# Patient Record
Sex: FEMALE | Race: BLACK OR AFRICAN AMERICAN | Employment: UNEMPLOYED | URBAN - METROPOLITAN AREA
[De-identification: names, ages, dates, MRNs, and addresses within clinical notes are randomized per-mention and may not be internally consistent; named-entity substitution may affect disease eponyms.]

---

## 2017-05-17 ENCOUNTER — OFFICE VISIT (OUTPATIENT)
Dept: INTERNAL MEDICINE CLINIC | Age: 32
End: 2017-05-17

## 2017-05-17 VITALS
TEMPERATURE: 98 F | BODY MASS INDEX: 34.05 KG/M2 | HEART RATE: 63 BPM | OXYGEN SATURATION: 98 % | HEIGHT: 65 IN | SYSTOLIC BLOOD PRESSURE: 111 MMHG | WEIGHT: 204.4 LBS | RESPIRATION RATE: 16 BRPM | DIASTOLIC BLOOD PRESSURE: 63 MMHG

## 2017-05-17 DIAGNOSIS — R51.9 ACUTE INTRACTABLE HEADACHE, UNSPECIFIED HEADACHE TYPE: Primary | ICD-10-CM

## 2017-05-17 DIAGNOSIS — Z12.4 CERVICAL CANCER SCREENING: ICD-10-CM

## 2017-05-17 DIAGNOSIS — N89.8 VAGINAL ITCHING: ICD-10-CM

## 2017-05-17 DIAGNOSIS — N89.8 VAGINAL DISCHARGE: ICD-10-CM

## 2017-05-17 DIAGNOSIS — J30.9 ALLERGIC RHINITIS, UNSPECIFIED ALLERGIC RHINITIS TRIGGER, UNSPECIFIED RHINITIS SEASONALITY: ICD-10-CM

## 2017-05-17 RX ORDER — IBUPROFEN 200 MG
1 TABLET ORAL EVERY 24 HOURS
COMMUNITY
End: 2018-01-30

## 2017-05-17 RX ORDER — FLUCONAZOLE 150 MG/1
150 TABLET ORAL DAILY
Qty: 1 TAB | Refills: 0 | Status: SHIPPED | OUTPATIENT
Start: 2017-05-17 | End: 2017-05-18

## 2017-05-17 RX ORDER — RISPERIDONE 0.25 MG/1
0.25 TABLET, FILM COATED ORAL
Refills: 0 | COMMUNITY
Start: 2017-05-05 | End: 2020-05-16

## 2017-05-17 RX ORDER — FLUTICASONE PROPIONATE 50 MCG
SPRAY, SUSPENSION (ML) NASAL
Qty: 1 BOTTLE | Refills: 2 | Status: SHIPPED | OUTPATIENT
Start: 2017-05-17 | End: 2017-12-29

## 2017-05-17 RX ORDER — LEVOCETIRIZINE DIHYDROCHLORIDE 5 MG/1
5 TABLET, FILM COATED ORAL DAILY
Qty: 30 TAB | Refills: 3 | Status: SHIPPED | OUTPATIENT
Start: 2017-05-17 | End: 2017-12-29

## 2017-05-17 NOTE — PATIENT INSTRUCTIONS
1) follow-up as needed or sooner if worsening symptoms. Allergies: Care Instructions  Your Care Instructions  Allergies occur when your body's defense system (immune system) overreacts to certain substances. The immune system treats a harmless substance as if it were a harmful germ or virus. Many things can cause this overreaction, including pollens, medicine, food, dust, animal dander, and mold. Allergies can be mild or severe. Mild allergies can be managed with home treatment. But medicine may be needed to prevent problems. Managing your allergies is an important part of staying healthy. Your doctor may suggest that you have allergy testing to help find out what is causing your allergies. When you know what things trigger your symptoms, you can avoid them. This can prevent allergy symptoms and other health problems. For severe allergies that cause reactions that affect your whole body (anaphylactic reactions), your doctor may prescribe a shot of epinephrine to carry with you in case you have a severe reaction. Learn how to give yourself the shot and keep it with you at all times. Make sure it is not . Follow-up care is a key part of your treatment and safety. Be sure to make and go to all appointments, and call your doctor if you are having problems. It's also a good idea to know your test results and keep a list of the medicines you take. How can you care for yourself at home? · If you have been told by your doctor that dust or dust mites are causing your allergy, decrease the dust around your bed:  [facility] sheets, pillowcases, and other bedding in hot water every week. ¨ Use dust-proof covers for pillows, duvets, and mattresses. Avoid plastic covers because they tear easily and do not \"breathe. \" Wash as instructed on the label. ¨ Do not use any blankets and pillows that you do not need. ¨ Use blankets that you can wash in your washing machine.   ¨ Consider removing drapes and carpets, which attract and hold dust, from your bedroom. · If you are allergic to house dust and mites, do not use home humidifiers. Your doctor can suggest ways you can control dust and mites. · Look for signs of cockroaches. Cockroaches cause allergic reactions. Use cockroach baits to get rid of them. Then, clean your home well. Cockroaches like areas where grocery bags, newspapers, empty bottles, or cardboard boxes are stored. Do not keep these inside your home, and keep trash and food containers sealed. Seal off any spots where cockroaches might enter your home. · If you are allergic to mold, get rid of furniture, rugs, and drapes that smell musty. Check for mold in the bathroom. · If you are allergic to outdoor pollen or mold spores, use air-conditioning. Change or clean all filters every month. Keep windows closed. · If you are allergic to pollen, stay inside when pollen counts are high. Use a vacuum  with a HEPA filter or a double-thickness filter at least two times each week. · Stay inside when air pollution is bad. Avoid paint fumes, perfumes, and other strong odors. · Avoid conditions that make your allergies worse. Stay away from smoke. Do not smoke or let anyone else smoke in your house. Do not use fireplaces or wood-burning stoves. · If you are allergic to your pets, change the air filter in your furnace every month. Use high-efficiency filters. · If you are allergic to pet dander, keep pets outside or out of your bedroom. Old carpet and cloth furniture can hold a lot of animal dander. You may need to replace them. When should you call for help? Give an epinephrine shot if:  · You think you are having a severe allergic reaction. · You have symptoms in more than one body area, such as mild nausea and an itchy mouth. After giving an epinephrine shot call 911, even if you feel better. Call 911 if:  · You have symptoms of a severe allergic reaction.  These may include:  ¨ Sudden raised, red areas (hives) all over your body. ¨ Swelling of the throat, mouth, lips, or tongue. ¨ Trouble breathing. ¨ Passing out (losing consciousness). Or you may feel very lightheaded or suddenly feel weak, confused, or restless. · You have been given an epinephrine shot, even if you feel better. Call your doctor now or seek immediate medical care if:  · You have symptoms of an allergic reaction, such as:  ¨ A rash or hives (raised, red areas on the skin). ¨ Itching. ¨ Swelling. ¨ Belly pain, nausea, or vomiting. Watch closely for changes in your health, and be sure to contact your doctor if:  · You do not get better as expected. Where can you learn more? Go to http://kaya-bernard.info/. Enter F699 in the search box to learn more about \"Allergies: Care Instructions. \"  Current as of: February 12, 2016  Content Version: 11.2  © 8406-5477 [employer]. Care instructions adapted under license by [email or web address] (which disclaims liability or warranty for this information). If you have questions about a medical condition or this instruction, always ask your healthcare professional. [place] [address] any warranty or liability for your use of this information. Vaginal Yeast Infection: Care Instructions  Your Care Instructions  A vaginal yeast infection is caused by too many yeast cells in the vagina. This is common in women of all ages. Itching, vaginal discharge and irritation, and other symptoms can bother you. But yeast infections don't often cause other health problems. Some medicines can increase your risk of getting a yeast infection. These include antibiotics, birth control pills, hormones, and steroids. You may also be more likely to get a yeast infection if you are pregnant, have diabetes, douche, or wear tight clothes. With treatment, most yeast infections get better in 2 to 3 days. Follow-up care is a key part of your treatment and safety.  Be sure to make and go to all appointments, and call your doctor if you are having problems. It's also a good idea to know your test results and keep a list of the medicines you take. How can you care for yourself at home? · Take your medicines exactly as prescribed. Call your doctor if you think you are having a problem with your medicine. · Ask your doctor about over-the-counter (OTC) medicines for yeast infections. They may cost less than prescription medicines. If you use an OTC treatment, read and follow all instructions on the label. · Do not use tampons while using a vaginal cream or suppository. The tampons can absorb the medicine. Use pads instead. · Wear loose cotton clothing. Do not wear nylon or other fabric that holds body heat and moisture close to the skin. · Try sleeping without underwear. · Do not scratch. Relieve itching with a cold pack or a cool bath. · Do not wash your vaginal area more than once a day. Use plain water or a mild, unscented soap. Air-dry the vaginal area. · Change out of wet swimsuits after swimming. · Do not have sex until you have finished your treatment. · Do not douche. When should you call for help? Call your doctor now or seek immediate medical care if:  · You have unexpected vaginal bleeding. · You have new or increased pain in your vagina or pelvis. Watch closely for changes in your health, and be sure to contact your doctor if:  · You have a fever. · You are not getting better after 2 days. · Your symptoms come back after you finish your medicines. Where can you learn more? Go to http://kaya-bernard.info/. Enter O179 in the search box to learn more about \"Vaginal Yeast Infection: Care Instructions. \"  Current as of: October 13, 2016  Content Version: 11.2  © 9617-7854 [employer]. Care instructions adapted under license by [employer] (which disclaims liability or warranty for this information).  If you have questions about a medical condition or this instruction, always ask your healthcare professional. [place] [address] any warranty or liability for your use of this information.

## 2017-05-17 NOTE — PROGRESS NOTES
ROOM # 2    Marcial Nuñez presents today for   Chief Complaint   Patient presents with    Headache     frequent severe HA's    Vaginal Itching     requesting pap/vag exam. clear discharge       Edra Tyler Schwartz preferred language for health care discussion is english/other. Is someone accompanying this pt? no    Is the patient using any DME equipment during OV? no    Depression Screening:  PHQ over the last two weeks 5/17/2017   Little interest or pleasure in doing things More than half the days   Feeling down, depressed or hopeless More than half the days   Total Score PHQ 2 4       Learning Assessment:  Learning Assessment 5/17/2017   PRIMARY LEARNER Patient   HIGHEST LEVEL OF EDUCATION - PRIMARY LEARNER  GRADUATED HIGH SCHOOL OR GED   BARRIERS PRIMARY LEARNER NONE   CO-LEARNER CAREGIVER No   PRIMARY LANGUAGE ENGLISH   LEARNER PREFERENCE PRIMARY LISTENING   ANSWERED BY patient   RELATIONSHIP SELF       Abuse Screening:  No flowsheet data found. Fall Risk  No flowsheet data found. Health Maintenance reviewed and discussed per provider. Yes    Marcial Nuñez is due for pap smear. Please order/place referral if appropriate. Advance Directive:  1. Do you have an advance directive in place? Patient Reply: no    2. If not, would you like material regarding how to put one in place? Patient Reply: no    Coordination of Care:  1. Have you been to the ER, urgent care clinic since your last visit? Hospitalized since your last visit? no    2. Have you seen or consulted any other health care providers outside of the [address] [place] [address] since your last visit? Include any pap smears or colon screening.  no

## 2017-05-17 NOTE — PROGRESS NOTES
Chief Complaint   Patient presents with    Headache     frequent severe HA's    Vaginal Itching     requesting pap/vag exam. clear discharge       HPI:     Natalia Vargas is a 33 y.o.  female with history of  ADHD and bipolar depression here for the above complaint. She is having vaginal itching for 1 week. She has vaginal discharge that is clear, but no fish odor. She has some lower abdominal cramping, but she is suppose to have her period next week. She was last sexually active 3 weeks ago. She has some tightness in her chest and some shortness of breath. No radiation down the arm. She was taking 200mg of ibuprofen for her headaches, but not helping now. Headaches: 1 week. Constant headaches. Located on both temples. She said the pain is a throbbing pain. Sound affects it, but not light. She denies any blurred vision or dizziness or diplopia. Past Medical History:   Diagnosis Date    ADHD (attention deficit hyperactivity disorder)     Anemia     Bipolar depression ([address]     Depression     Miscarriage 09/2016     Past Surgical History:   Procedure Laterality Date    ABDOMEN SURGERY PROC UNLISTED      HX GI      HX OTHER SURGICAL      rectal surgery for abscess    HX OTHER SURGICAL      diverticulum near ovary removed, transvaginal     Current Outpatient Prescriptions   Medication Sig    risperiDONE (RISPERDAL) 0.25 mg tablet Take 0.25 mg by mouth nightly as needed.  NAPROXEN (NAPROSYN PO) Take  by mouth daily.  nicotine (NICODERM CQ) 14 mg/24 hr patch 1 Patch by TransDERmal route every twenty-four (24) hours.  fluconazole (DIFLUCAN) 150 mg tablet Take 1 Tab by mouth daily for 1 day. FDA advises cautious prescribing of oral fluconazole in pregnancy.  levocetirizine (XYZAL) 5 mg tablet Take 1 Tab by mouth daily.     fluticasone (FLONASE) 50 mcg/actuation nasal spray Use 2 sprays in each nostril daily     No current facility-administered medications for this visit. Health Maintenance   Topic Date Due    INFLUENZA AGE 9 TO ADULT  08/01/2017    PAP AKA CERVICAL CYTOLOGY  05/17/2020    DTaP/Tdap/Td series (2 - Td) 12/08/2026    Pneumococcal 19-64 Medium Risk  Addressed       There is no immunization history on file for this patient. Patient's last menstrual period was 04/24/2017. Allergies and Intolerances: Allergies   Allergen Reactions    Peanut Hives       Family History:   Family History   Problem Relation Age of Onset    Hypertension Mother     Diabetes Mother     Diabetes Father     Hypertension Father     Stroke Maternal Grandmother        Social History:   She  reports that she quit smoking about 2 months ago. She has a 2.50 pack-year smoking history. She has never used smokeless tobacco.  She  reports that she drinks alcohol. OBJECTIVE:   Physical exam:   Visit Vitals    /63 (BP 1 Location: Left arm, BP Patient Position: Sitting)    Pulse 63    Temp 98 °F (36.7 °C) (Oral)    Resp 16    Ht 5' 5\" (1.651 m)    Wt 204 lb 6.4 oz (92.7 kg)    LMP 04/24/2017    SpO2 98%    BMI 34.01 kg/m2        Generally: Pleasant female in no acute distress  HEENT Exam: Head: atraumatic, acephalic                Eyes: PERRLA     Ears: bilaterally normal TM, no erythema or exudate, normal light reflex    Nares: mucosal membranes moist, no erythema    Mouth: Clear, no erythema or exudate    Neck: supple, no LAD    Cardiac Exam: regular, rate, and rhythm. Normal S1 and S2. No murmurs, gallops, or rubs  Pulmonary exam: Clear to auscultation bilaterally  Abdominal exam: Positive bowel sounds in all four quadrants, soft, nondistended, nontender  Extremities: 2+ dorsalis pedis pulses bilaterally. No pedal edema    bilaterally  Gyn exam: external genitalia was normal. Vaginal vault: copious white discharge with bubbles and possible fishy odor. Cervix normal with some bleeding as pt is going to get her period next week.  Otherwise normal vaginal mucosa. LABS/RADIOLOGICAL TESTS:  Lab Results   Component Value Date/Time    WBC 7.4 08/29/2016 09:20 AM    HGB 9.9 08/29/2016 09:20 AM    HCT 31.1 08/29/2016 09:20 AM    PLATELET 471 04/07/0457 09:20 AM     Lab Results   Component Value Date/Time    Sodium 138 07/21/2016 11:26 AM    Potassium 3.7 07/21/2016 11:26 AM    Chloride 105 07/21/2016 11:26 AM    CO2 23 07/21/2016 11:26 AM    Glucose 89 07/21/2016 11:26 AM    BUN 17 07/21/2016 11:26 AM    Creatinine 0.58 07/21/2016 11:26 AM     No results found for: CHOL, CHOLX, CHLST, CHOLV, HDL, LDL, DLDL, LDLC, DLDLP, TGL, TGLX, TRIGL, TRIGP  No results found for: GPT    Previous labs    ASSESSMENT/PLAN:    1. Acute intractable headache, unspecified headache type: think these headaches might be due to allergies. Sent electronically the xyzal and flonase. 2. Allergic rhinitis, unspecified allergic rhinitis trigger, unspecified rhinitis seasonality  -     levocetirizine (XYZAL) 5 mg tablet; Take 1 Tab by mouth daily. -     fluticasone (FLONASE) 50 mcg/actuation nasal spray; Use 2 sprays in each nostril daily    3. Vaginal itching  -     NUSWAB VAGINITIS PLUS; Future  -     fluconazole (DIFLUCAN) 150 mg tablet; Take 1 Tab by mouth daily for 1 day. FDA advises cautious prescribing of oral fluconazole in pregnancy. -     NUSWAB VAGINITIS PLUS    4. Vaginal discharge  -     NUSWAB VAGINITIS PLUS; Future  -     NUSWAB VAGINITIS PLUS    5. Cervical cancer screening  -     PAP, IG, RFX HPV ASCUS ([id number]); Future  -     PAP, IG, RFX HPV ASCUS ([id number])    6. Requested Prescriptions     Signed Prescriptions Disp Refills    fluconazole (DIFLUCAN) 150 mg tablet 1 Tab 0     Sig: Take 1 Tab by mouth daily for 1 day. FDA advises cautious prescribing of oral fluconazole in pregnancy.  levocetirizine (XYZAL) 5 mg tablet 30 Tab 3     Sig: Take 1 Tab by mouth daily.     fluticasone (FLONASE) 50 mcg/actuation nasal spray 1 Bottle 2     Sig: Use 2 sprays in each nostril daily     7. Patient verbalized understanding and agreement with the plan. 8. Patient was given an after-visit summary. 9.   Follow-up Disposition:  Return if symptoms worsen or fail to improve. or sooner if worsening symptoms.           Rosemary Quijano MD

## 2017-05-17 NOTE — MR AVS SNAPSHOT
Visit Information Date & Time Provider Department Dept. Phone Encounter #  
 5/17/2017 12:45 PM Sriram Mo MD [address] [phone] [id number] Follow-up Instructions Return if symptoms worsen or fail to improve. Upcoming Health Maintenance Date Due  
 PAP AKA CERVICAL CYTOLOGY 7/9/2004 INFLUENZA AGE 9 TO ADULT 8/1/2017 DTaP/Tdap/Td series (2 - Td) 12/8/2026 Allergies as of 5/17/2017  Review Complete On: 5/17/2017 By: Vincent Bernabe MD  
  
 Severity Noted Reaction Type Reactions Peanut  12/16/2014    Hives Current Immunizations  Never Reviewed No immunizations on file. Not reviewed this visit You Were Diagnosed With   
  
 Codes Comments Acute intractable headache, unspecified headache type    -  Primary ICD-10-CM: R51 ICD-9-CM: 573. 0 Vaginal itching     ICD-10-CM: L29.8 ICD-9-CM: 864. 1 Vaginal discharge     ICD-10-CM: N89.8 ICD-9-CM: 623.5 Cervical cancer screening     ICD-10-CM: Z12.4 ICD-9-CM: V76.2 Allergic rhinitis, unspecified allergic rhinitis trigger, unspecified rhinitis seasonality     ICD-10-CM: J30.9 ICD-9-CM: 477.9 Vitals BP Pulse Temp Resp Height(growth percentile) Weight(growth percentile) 111/63 (BP 1 Location: Left arm, BP Patient Position: Sitting) 63 98 °F (36.7 °C) (Oral) 16 5' 5\" (1.651 m) 204 lb 6.4 oz (92.7 kg) LMP SpO2 BMI OB Status Smoking Status 04/24/2017 98% 34.01 kg/m2 Having regular periods Former Smoker Vitals History BMI and BSA Data Body Mass Index Body Surface Area 34.01 kg/m 2 2.06 m 2 Preferred Pharmacy Pharmacy Name Phone [facility] [address] [place], [place] [address] [phone] Your Updated Medication List  
  
   
This list is accurate as of: 5/17/17  1:12 PM.  Always use your most recent med list.  
  
  
  
  
 fluconazole 150 mg tablet Commonly known as:  DIFLUCAN Take 1 Tab by mouth daily for 1 day. FDA advises cautious prescribing of oral fluconazole in pregnancy. fluticasone 50 mcg/actuation nasal spray Commonly known as:  Sindy Estrada Use 2 sprays in each nostril daily  
  
 levocetirizine 5 mg tablet Commonly known as:  Olman Drones Take 1 Tab by mouth daily. NAPROSYN PO Take  by mouth daily. nicotine 14 mg/24 hr patch Commonly known as:  NICODERM CQ  
1 Patch by TransDERmal route every twenty-four (24) hours. risperiDONE 0.25 mg tablet Commonly known as:  RisperDAL Take 0.25 mg by mouth nightly as needed. Prescriptions Sent to Pharmacy Refills  
 fluconazole (DIFLUCAN) 150 mg tablet 0 Sig: Take 1 Tab by mouth daily for 1 day. FDA advises cautious prescribing of oral fluconazole in pregnancy. Class: Normal  
 Pharmacy: [facility] Ph #: [phone] Route: Oral  
 levocetirizine (XYZAL) 5 mg tablet 3 Sig: Take 1 Tab by mouth daily. Class: Normal  
 Pharmacy: [facility] Ph #: [phone] Route: Oral  
 fluticasone (FLONASE) 50 mcg/actuation nasal spray 2 Sig: Use 2 sprays in each nostril daily Class: Normal  
 Pharmacy: [facility] Ph #: [phone] Follow-up Instructions Return if symptoms worsen or fail to improve. To-Do List   
 05/17/2017 Lab:  NUSWAB VAGINITIS PLUS   
  
 05/17/2017 Pathology:  PAP, IG, RFX HPV ASCUS ([id number]) Patient Instructions 1) follow-up as needed or sooner if worsening symptoms. Allergies: Care Instructions Your Care Instructions Allergies occur when your body's defense system (immune system) overreacts to certain substances. The immune system treats a harmless substance as if it were a harmful germ or virus. Many things can cause this overreaction, including pollens, medicine, food, dust, animal dander, and mold. Allergies can be mild or severe. Mild allergies can be managed with home treatment. But medicine may be needed to prevent problems. Managing your allergies is an important part of staying healthy. Your doctor may suggest that you have allergy testing to help find out what is causing your allergies. When you know what things trigger your symptoms, you can avoid them. This can prevent allergy symptoms and other health problems. For severe allergies that cause reactions that affect your whole body (anaphylactic reactions), your doctor may prescribe a shot of epinephrine to carry with you in case you have a severe reaction. Learn how to give yourself the shot and keep it with you at all times. Make sure it is not . Follow-up care is a key part of your treatment and safety. Be sure to make and go to all appointments, and call your doctor if you are having problems. It's also a good idea to know your test results and keep a list of the medicines you take. How can you care for yourself at home? · If you have been told by your doctor that dust or dust mites are causing your allergy, decrease the dust around your bed: 
[facility] [employer] sheets, pillowcases, and other bedding in hot water every week. ¨ Use dust-proof covers for pillows, duvets, and mattresses. Avoid plastic covers because they tear easily and do not \"breathe. \" Wash as instructed on the label. ¨ Do not use any blankets and pillows that you do not need. ¨ Use blankets that you can wash in your washing machine. ¨ Consider removing drapes and carpets, which attract and hold dust, from your bedroom. · If you are allergic to house dust and mites, do not use home humidifiers. Your doctor can suggest ways you can control dust and mites. · Look for signs of cockroaches. Cockroaches cause allergic reactions. Use cockroach baits to get rid of them. Then, clean your home well. Cockroaches like areas where grocery bags, newspapers, empty bottles, or cardboard boxes are stored. Do not keep these inside your home, and keep trash and food containers sealed. Seal off any spots where cockroaches might enter your home. · If you are allergic to mold, get rid of furniture, rugs, and drapes that smell musty. Check for mold in the bathroom. · If you are allergic to outdoor pollen or mold spores, use air-conditioning. Change or clean all filters every month. Keep windows closed. · If you are allergic to pollen, stay inside when pollen counts are high. Use a vacuum  with a HEPA filter or a double-thickness filter at least two times each week. · Stay inside when air pollution is bad. Avoid paint fumes, perfumes, and other strong odors. · Avoid conditions that make your allergies worse. Stay away from smoke. Do not smoke or let anyone else smoke in your house. Do not use fireplaces or wood-burning stoves. · If you are allergic to your pets, change the air filter in your furnace every month. Use high-efficiency filters. · If you are allergic to pet dander, keep pets outside or out of your bedroom. Old carpet and cloth furniture can hold a lot of animal dander. You may need to replace them. When should you call for help? Give an epinephrine shot if: 
· You think you are having a severe allergic reaction. · You have symptoms in more than one body area, such as mild nausea and an itchy mouth. After giving an epinephrine shot call 911, even if you feel better. Call 911 if: 
· You have symptoms of a severe allergic reaction. These may include: 
¨ Sudden raised, red areas (hives) all over your body. ¨ Swelling of the throat, mouth, lips, or tongue. ¨ Trouble breathing. ¨ Passing out (losing consciousness).  Or you may feel very lightheaded or suddenly feel weak, confused, or restless. · You have been given an epinephrine shot, even if you feel better. Call your doctor now or seek immediate medical care if: 
· You have symptoms of an allergic reaction, such as: ¨ A rash or hives (raised, red areas on the skin). ¨ Itching. ¨ Swelling. ¨ Belly pain, nausea, or vomiting. Watch closely for changes in your health, and be sure to contact your doctor if: 
· You do not get better as expected. Where can you learn more? Go to http://kaya-bernard.info/. Enter X151 in the search box to learn more about \"Allergies: Care Instructions. \" Current as of: February 12, 2016 Content Version: 11.2 © 7665-1341 [employer]. Care instructions adapted under license by Evernote (which disclaims liability or warranty for this information). If you have questions about a medical condition or this instruction, always ask your healthcare professional. [place] [address] any warranty or liability for your use of this information. Vaginal Yeast Infection: Care Instructions Your Care Instructions A vaginal yeast infection is caused by too many yeast cells in the vagina. This is common in women of all ages. Itching, vaginal discharge and irritation, and other symptoms can bother you. But yeast infections don't often cause other health problems. Some medicines can increase your risk of getting a yeast infection. These include antibiotics, birth control pills, hormones, and steroids. You may also be more likely to get a yeast infection if you are pregnant, have diabetes, douche, or wear tight clothes. With treatment, most yeast infections get better in 2 to 3 days. Follow-up care is a key part of your treatment and safety. Be sure to make and go to all appointments, and call your doctor if you are having problems. It's also a good idea to know your test results and keep a list of the medicines you take. How can you care for yourself at home? · Take your medicines exactly as prescribed. Call your doctor if you think you are having a problem with your medicine. · Ask your doctor about over-the-counter (OTC) medicines for yeast infections. They may cost less than prescription medicines. If you use an OTC treatment, read and follow all instructions on the label. · Do not use tampons while using a vaginal cream or suppository. The tampons can absorb the medicine. Use pads instead. · Wear loose cotton clothing. Do not wear nylon or other fabric that holds body heat and moisture close to the skin. · Try sleeping without underwear. · Do not scratch. Relieve itching with a cold pack or a cool bath. · Do not wash your vaginal area more than once a day. Use plain water or a mild, unscented soap. Air-dry the vaginal area. · Change out of wet swimsuits after swimming. · Do not have sex until you have finished your treatment. · Do not douche. When should you call for help? Call your doctor now or seek immediate medical care if: 
· You have unexpected vaginal bleeding. · You have new or increased pain in your vagina or pelvis. Watch closely for changes in your health, and be sure to contact your doctor if: 
· You have a fever. · You are not getting better after 2 days. · Your symptoms come back after you finish your medicines. Where can you learn more? Go to http://kaya-bernard.info/. Enter G290 in the search box to learn more about \"Vaginal Yeast Infection: Care Instructions. \" Current as of: October 13, 2016 Content Version: 11.2 © 8854-9025 [employer]. Care instructions adapted under license by [employer] (which disclaims liability or warranty for this information).  If you have questions about a medical condition or this instruction, always ask your healthcare professional. [address] any warranty or liability for your use of this information. Introducing [facility]! Dear Kirk Gant: Thank you for requesting a Cartagenia account. Our records indicate that you already have an active Cartagenia account. You can access your account anytime at [email or web address] Did you know that you can access your hospital and ER discharge instructions at any time in Cartagenia? You can also review all of your test results from your hospital stay or ER visit. Additional Information If you have questions, please visit the Frequently Asked Questions section of the Cartagenia website at [email or web address] Remember, Cartagenia is NOT to be used for urgent needs. For medical emergencies, dial 911. Now available from your iPhone and Android! Please provide this summary of care documentation to your next provider. Your primary care clinician is listed as Cleveland Reyes. If you have any questions after today's visit, please call [phone].

## 2017-05-20 LAB
A VAGINAE DNA VAG QL NAA+PROBE: ABNORMAL SCORE
BVAB2 DNA VAG QL NAA+PROBE: ABNORMAL SCORE
C ALBICANS DNA VAG QL NAA+PROBE: NEGATIVE
C GLABRATA DNA VAG QL NAA+PROBE: NEGATIVE
C TRACH RRNA SPEC QL NAA+PROBE: NEGATIVE
MEGA1 DNA VAG QL NAA+PROBE: ABNORMAL SCORE
N GONORRHOEA RRNA SPEC QL NAA+PROBE: NEGATIVE
T VAGINALIS RRNA SPEC QL NAA+PROBE: POSITIVE

## 2017-05-21 DIAGNOSIS — B96.89 BV (BACTERIAL VAGINOSIS): Primary | ICD-10-CM

## 2017-05-21 DIAGNOSIS — A59.9 TRICHOMONIASIS: ICD-10-CM

## 2017-05-21 DIAGNOSIS — N76.0 BV (BACTERIAL VAGINOSIS): Primary | ICD-10-CM

## 2017-05-21 RX ORDER — METRONIDAZOLE 500 MG/1
500 TABLET ORAL 2 TIMES DAILY
Qty: 14 TAB | Refills: 0 | Status: SHIPPED | OUTPATIENT
Start: 2017-05-21 | End: 2017-05-28

## 2017-05-21 NOTE — PROGRESS NOTES
Please let pt know that NUSWAB shows:    1) BV    2) trichmoniasis. She needs to have her partner treated as well. 3) negative for yeast, chlamydia and gonorrhea. 4) will send electronically flagyl 500 mg one po bid x 7 days #14 no refills. This will take care of both BV and trichmoniasis.

## 2017-05-22 ENCOUNTER — TELEPHONE (OUTPATIENT)
Dept: INTERNAL MEDICINE CLINIC | Age: 32
End: 2017-05-22

## 2017-05-22 LAB
CYTOLOGIST CVX/VAG CYTO: NORMAL
CYTOLOGY CVX/VAG DOC THIN PREP: NORMAL
DX ICD CODE: NORMAL
DX ICD CODE: NORMAL
LABCORP, 190119: NORMAL
Lab: NORMAL
Lab: NORMAL
OTHER STN SPEC: NORMAL
PATH REPORT.FINAL DX SPEC: NORMAL
STAT OF ADQ CVX/VAG CYTO-IMP: NORMAL

## 2017-05-22 NOTE — TELEPHONE ENCOUNTER
----- Message from Edis Claudio MD sent at 5/21/2017 10:07 AM EDT -----  Please let pt know that Suzanne Adams shows:    1) BV    2) trichmoniasis. She needs to have her partner treated as well. 3) negative for yeast, chlamydia and gonorrhea. 4) will send electronically flagyl 500 mg one po bid x 7 days #14 no refills. This will take care of both BV and trichmoniasis.

## 2017-05-22 NOTE — TELEPHONE ENCOUNTER
Talked to pt and told her the below result note as well as her pap smear was negative, but also showed trichomoniasis. Told her that she needs to have her partner treated ASAP. Pt verbalized understanding. Notes Recorded by Mis Gann MD on 5/21/2017 at 10:07 AM  Please let pt know that Ben Good shows:    1) BV    2) trichmoniasis. She needs to have her partner treated as well. 3) negative for yeast, chlamydia and gonorrhea. 4) will send electronically flagyl 500 mg one po bid x 7 days #14 no refills. This will take care of both BV and trichmoniasis.

## 2017-12-26 ENCOUNTER — TELEPHONE (OUTPATIENT)
Dept: INTERNAL MEDICINE CLINIC | Age: 32
End: 2017-12-26

## 2017-12-26 NOTE — TELEPHONE ENCOUNTER
2 pt. Identifiers confirmed. Pt. States that she went to donate plasma and they told her they may or may not have seen it and that she should be tested to confirm. She was deferred from donating plasma bc of this. She also states she has been having unprotected intercourse c her partner who is 48. She is no longer seeing Dr. Sid Goodman and Dr. Lyn Aguirre is her PCP.

## 2017-12-26 NOTE — TELEPHONE ENCOUNTER
Contacted pt at [facility] number. Two patient Identifiers confirmed. Advised pt per Dr Maral Jones notes. Pt stated she had been going to [facility] x 2 years and had not had any issues and was advised that testing results for hep that they done were not in as of yet. Inquired what guidelines they gave for dx. She stated they didn't they just said she may have it. Advised pt to wait on lab results and advised us once she received results. Pt verbalized understanding.

## 2017-12-26 NOTE — TELEPHONE ENCOUNTER
1)  Dr. Donato Gan has only seen her once  2)  Dr. Lambert Hardy is listed as her PCP  3)  for what reason does she want this test? She is not in a high risk category as far as we know.

## 2017-12-26 NOTE — TELEPHONE ENCOUNTER
Sorry, I am confused. Did they or did they not find evidence of Hep C (or any other hepatitis)? There is no \"maybe we did and maybe we didn't.\" It's either there or it's not. Regarding her boyfriend, unless there is reason to suspect he has Hep C, there is no indication for testing her. He is not in the baby boomer crowd for which testing is indicated.

## 2017-12-29 ENCOUNTER — HOSPITAL ENCOUNTER (EMERGENCY)
Age: 32
Discharge: HOME | End: 2017-12-29
Attending: EMERGENCY MEDICINE | Admitting: EMERGENCY MEDICINE
Payer: MEDICAID

## 2017-12-29 VITALS
OXYGEN SATURATION: 100 % | HEIGHT: 65 IN | WEIGHT: 210 LBS | DIASTOLIC BLOOD PRESSURE: 87 MMHG | RESPIRATION RATE: 16 BRPM | BODY MASS INDEX: 34.99 KG/M2 | SYSTOLIC BLOOD PRESSURE: 134 MMHG | TEMPERATURE: 98.5 F | HEART RATE: 74 BPM

## 2017-12-29 DIAGNOSIS — R03.0 ELEVATED BLOOD PRESSURE READING: ICD-10-CM

## 2017-12-29 DIAGNOSIS — A59.9 TRICHIMONIASIS: Primary | ICD-10-CM

## 2017-12-29 LAB
ALBUMIN SERPL-MCNC: 3.9 G/DL (ref 3.4–5)
ALBUMIN/GLOB SERPL: 1.1 {RATIO} (ref 0.8–1.7)
ALP SERPL-CCNC: 52 U/L (ref 45–117)
ALT SERPL-CCNC: 40 U/L (ref 13–56)
ANION GAP SERPL CALC-SCNC: 6 MMOL/L (ref 3–18)
APPEARANCE UR: ABNORMAL
AST SERPL-CCNC: 19 U/L (ref 15–37)
BACTERIA URNS QL MICRO: ABNORMAL /HPF
BASOPHILS # BLD: 0 K/UL (ref 0–0.06)
BASOPHILS NFR BLD: 0 % (ref 0–2)
BILIRUB DIRECT SERPL-MCNC: <0.1 MG/DL (ref 0–0.2)
BILIRUB SERPL-MCNC: 0.3 MG/DL (ref 0.2–1)
BILIRUB UR QL: NEGATIVE
BUN SERPL-MCNC: 13 MG/DL (ref 7–18)
BUN/CREAT SERPL: 18 (ref 12–20)
CALCIUM SERPL-MCNC: 8.3 MG/DL (ref 8.5–10.1)
CHLORIDE SERPL-SCNC: 105 MMOL/L (ref 100–108)
CO2 SERPL-SCNC: 27 MMOL/L (ref 21–32)
COLOR UR: YELLOW
CREAT SERPL-MCNC: 0.71 MG/DL (ref 0.6–1.3)
DIFFERENTIAL METHOD BLD: ABNORMAL
EOSINOPHIL # BLD: 0.2 K/UL (ref 0–0.4)
EOSINOPHIL NFR BLD: 3 % (ref 0–5)
EPITH CASTS URNS QL MICRO: ABNORMAL /LPF (ref 0–5)
ERYTHROCYTE [DISTWIDTH] IN BLOOD BY AUTOMATED COUNT: 14.3 % (ref 11.6–14.5)
GLOBULIN SER CALC-MCNC: 3.7 G/DL (ref 2–4)
GLUCOSE SERPL-MCNC: 97 MG/DL (ref 74–99)
GLUCOSE UR STRIP.AUTO-MCNC: NEGATIVE MG/DL
HCG UR QL: NEGATIVE
HCT VFR BLD AUTO: 37.6 % (ref 35–45)
HGB BLD-MCNC: 11.9 G/DL (ref 12–16)
HGB UR QL STRIP: NEGATIVE
KETONES UR QL STRIP.AUTO: NEGATIVE MG/DL
LEUKOCYTE ESTERASE UR QL STRIP.AUTO: ABNORMAL
LIPASE SERPL-CCNC: 180 U/L (ref 73–393)
LYMPHOCYTES # BLD: 3.7 K/UL (ref 0.9–3.6)
LYMPHOCYTES NFR BLD: 48 % (ref 21–52)
MCH RBC QN AUTO: 23.9 PG (ref 24–34)
MCHC RBC AUTO-ENTMCNC: 31.6 G/DL (ref 31–37)
MCV RBC AUTO: 75.7 FL (ref 74–97)
MONOCYTES # BLD: 0.5 K/UL (ref 0.05–1.2)
MONOCYTES NFR BLD: 6 % (ref 3–10)
NEUTS SEG # BLD: 3.3 K/UL (ref 1.8–8)
NEUTS SEG NFR BLD: 43 % (ref 40–73)
NITRITE UR QL STRIP.AUTO: NEGATIVE
PH UR STRIP: 5 [PH] (ref 5–8)
PLATELET # BLD AUTO: 260 K/UL (ref 135–420)
PMV BLD AUTO: 12.1 FL (ref 9.2–11.8)
POTASSIUM SERPL-SCNC: 3.7 MMOL/L (ref 3.5–5.5)
PROT SERPL-MCNC: 7.6 G/DL (ref 6.4–8.2)
PROT UR STRIP-MCNC: NEGATIVE MG/DL
RBC # BLD AUTO: 4.97 M/UL (ref 4.2–5.3)
RBC #/AREA URNS HPF: ABNORMAL /HPF (ref 0–5)
SODIUM SERPL-SCNC: 138 MMOL/L (ref 136–145)
SP GR UR REFRACTOMETRY: 1.02 (ref 1–1.03)
TRICHOMONAS UR QL MICRO: ABNORMAL
UROBILINOGEN UR QL STRIP.AUTO: 0.2 EU/DL (ref 0.2–1)
WBC # BLD AUTO: 7.6 K/UL (ref 4.6–13.2)
WBC URNS QL MICRO: ABNORMAL /HPF (ref 0–4)

## 2017-12-29 PROCEDURE — 80076 HEPATIC FUNCTION PANEL: CPT | Performed by: NURSE PRACTITIONER

## 2017-12-29 PROCEDURE — 74011250637 HC RX REV CODE- 250/637: Performed by: NURSE PRACTITIONER

## 2017-12-29 PROCEDURE — 85025 COMPLETE CBC W/AUTO DIFF WBC: CPT | Performed by: NURSE PRACTITIONER

## 2017-12-29 PROCEDURE — 99283 EMERGENCY DEPT VISIT LOW MDM: CPT

## 2017-12-29 PROCEDURE — 81001 URINALYSIS AUTO W/SCOPE: CPT | Performed by: NURSE PRACTITIONER

## 2017-12-29 PROCEDURE — 83690 ASSAY OF LIPASE: CPT | Performed by: NURSE PRACTITIONER

## 2017-12-29 PROCEDURE — 81025 URINE PREGNANCY TEST: CPT | Performed by: NURSE PRACTITIONER

## 2017-12-29 PROCEDURE — 80048 BASIC METABOLIC PNL TOTAL CA: CPT | Performed by: NURSE PRACTITIONER

## 2017-12-29 RX ORDER — METRONIDAZOLE 250 MG/1
2000 TABLET ORAL
Status: COMPLETED | OUTPATIENT
Start: 2017-12-29 | End: 2017-12-29

## 2017-12-29 RX ADMIN — METRONIDAZOLE 2000 MG: 250 TABLET ORAL at 11:49

## 2017-12-29 NOTE — ED NOTES
Assume care of patient, patient her because she [facility] that she might have a problem with her blood (Hep C) and was told to get it checked out, patient also C/O lower abdominal pain and urinary frequency, patient states that she has no vaginal discharge at this time.   Purposeful rounding completed:    Side rails up x 1:  YES  Bed in low position and wheels locked: YES  Call bell within reach: YES  Comfort addressed: YES    Toileting needs addressed: YES  Plan of care reviewed/updated with patient and or family members: YES  IV site assessed: N/A  Pain assessed and addressed: YES, 4

## 2017-12-29 NOTE — DISCHARGE INSTRUCTIONS
Elevated Blood Pressure: Care Instructions  Your Care Instructions    Blood pressure is a measure of how hard the blood pushes against the walls of your arteries. It's normal for blood pressure to go up and down throughout the day. But if it stays up over time, you have high blood pressure. Two numbers tell you your blood pressure. The first number is the systolic pressure. It shows how hard the blood pushes when your heart is pumping. The second number is the diastolic pressure. It shows how hard the blood pushes between heartbeats, when your heart is relaxed and filling with blood. An ideal blood pressure in adults is less than 120/80 (say \"120 over 80\"). High blood pressure is 140/90 or higher. You have high blood pressure if your top number is 140 or higher or your bottom number is 90 or higher, or both. The main test for high blood pressure is simple, fast, and painless. To diagnose high blood pressure, your doctor will test your blood pressure at different times. After testing your blood pressure, your doctor may ask you to test it again when you are home. If you are diagnosed with high blood pressure, you can work with your doctor to make a long-term plan to manage it. Follow-up care is a key part of your treatment and safety. Be sure to make and go to all appointments, and call your doctor if you are having problems. It's also a good idea to know your test results and keep a list of the medicines you take. How can you care for yourself at home? · Do not smoke. Smoking increases your risk for heart attack and stroke. If you need help quitting, talk to your doctor about stop-smoking programs and medicines. These can increase your chances of quitting for good. · Stay at a healthy weight. · Try to limit how much sodium you eat to less than 2,300 milligrams (mg) a day. Your doctor may ask you to try to eat less than 1,500 mg a day. · Be physically active.  Get at least 30 minutes of exercise on most days of the week. Walking is a good choice. You also may want to do other activities, such as running, swimming, cycling, or playing tennis or team sports. · Avoid or limit alcohol. Talk to your doctor about whether you can drink any alcohol. · Eat plenty of fruits, vegetables, and low-fat dairy products. Eat less saturated and total fats. · Learn how to check your blood pressure at home. When should you call for help? Call your doctor now or seek immediate medical care if:  ? · Your blood pressure is much higher than normal (such as 180/110 or higher). ? · You think high blood pressure is causing symptoms such as:  ¨ Severe headache. ¨ Blurry vision. ? Watch closely for changes in your health, and be sure to contact your doctor if:  ? · You do not get better as expected. Where can you learn more? Go to http://kayaWishdatesbernard.info/. Enter A081 in the search box to learn more about \"Elevated Blood Pressure: Care Instructions. \"  Current as of: September 21, 2016  Content Version: 11.4  © 7262-3029 [employer]. Care instructions adapted under license by [employer] (which disclaims liability or warranty for this information). If you have questions about a medical condition or this instruction, always ask your healthcare professional. [address] any warranty or liability for your use of this information. Trichomoniasis: Care Instructions  Your Care Instructions  Trichomoniasis is a sexually transmitted infection (STI) that is spread by having sex with an infected partner. Trichomoniasis is commonly called trich (say \"trick\"). In women, trich may cause vaginal itching and a smelly discharge. But in many cases, especially in men, there are no symptoms. Liliya Squibb is treated so that you do not spread it to others. Both you and your sex partner or partners should be treated at the same time so you do not infect each other again.  Trich may cause problems with pregnancy. Your doctor will talk with you about treatment for Trich if you are pregnant. Follow-up care is a key part of your treatment and safety. Be sure to make and go to all appointments, and call your doctor if you are having problems. It's also a good idea to know your test results and keep a list of the medicines you take. How can you care for yourself at home? · Take your antibiotics as directed. Do not stop taking them just because you feel better. You need to take the full course of antibiotics. · Do not have sex while you are being treated. If your doctor gave you a single dose of antibiotics, do not have sex for one week after being treated and until your partner also has been treated. · Tell your sex partner (or partners) that he or she will also need to be tested and treated. · Use a cold water compress or cool baths to relieve itching. To prevent trichomoniasis in the future  · Use latex condoms every time you have sex. Use them from the beginning to the end of sexual contact. · Talk to your partner before having sex. Find out if he or she has or is at risk for trich or any other STI. Keep in mind that a person may be able to spread an STI even if he or she does not have symptoms. · Do not have sex if you are being treated for trich or any other STI. · Do not have sex with anyone who has symptoms of an STI, such as sores on the genitals or mouth. · Having one sex partner (who does not have STIs and does not have sex with anyone else) is a good way to avoid STIs. When should you call for help? Call your doctor now or seek immediate medical care if:  ? · You have unusual vaginal bleeding. ? · You have a fever. ? · You have new discharge from the vagina or penis. ? · You have pelvic pain. ? Watch closely for changes in your health, and be sure to contact your doctor if:  ? · You do not get better as expected.    ? · You have any new symptoms or your symptoms get worse.   Where can you learn more? Go to http://kaya-bernard.info/. Enter Z679 in the search box to learn more about \"Trichomoniasis: Care Instructions. \"  Current as of: March 20, 2017  Content Version: 11.4  © 9654-3943 [employer]. Care instructions adapted under license by [employer] (which disclaims liability or warranty for this information). If you have questions about a medical condition or this instruction, always ask your healthcare professional. [place] [address] any warranty or liability for your use of this information. RESULTS:    No orders to display       Labs Reviewed   CBC WITH AUTOMATED DIFF - Abnormal; Notable for the following:        Result Value    HGB 11.9 (*)     MCH 23.9 (*)     MPV 12.1 (*)     ABS. LYMPHOCYTES 3.7 (*)     All other components within normal limits   METABOLIC PANEL, BASIC - Abnormal; Notable for the following:     Calcium 8.3 (*)     All other components within normal limits   URINALYSIS W/ RFLX MICROSCOPIC - Abnormal; Notable for the following:     Leukocyte Esterase SMALL (*)     All other components within normal limits   URINE MICROSCOPIC ONLY - Abnormal; Notable for the following:     Bacteria 1+ (*)     Trichomonas FEW (*)     All other components within normal limits   HEPATIC FUNCTION PANEL   LIPASE   HCG URINE, QL       Recent Results (from the past 12 hour(s))   CBC WITH AUTOMATED DIFF    Collection Time: 12/29/17  9:00 AM   Result Value Ref Range    WBC 7.6 4.6 - 13.2 K/uL    RBC 4.97 4.20 - 5.30 M/uL    HGB 11.9 (L) 12.0 - 16.0 g/dL    HCT 37.6 35.0 - 45.0 %    MCV 75.7 74.0 - 97.0 FL    MCH 23.9 (L) 24.0 - 34.0 PG    MCHC 31.6 31.0 - 37.0 g/dL    RDW 14.3 11.6 - 14.5 %    PLATELET 747 250 - 425 K/uL    MPV 12.1 (H) 9.2 - 11.8 FL    NEUTROPHILS 43 40 - 73 %    LYMPHOCYTES 48 21 - 52 %    MONOCYTES 6 3 - 10 %    EOSINOPHILS 3 0 - 5 %    BASOPHILS 0 0 - 2 %    ABS.  NEUTROPHILS 3.3 1.8 - 8.0 K/UL ABS. LYMPHOCYTES 3.7 (H) 0.9 - 3.6 K/UL    ABS. MONOCYTES 0.5 0.05 - 1.2 K/UL    ABS. EOSINOPHILS 0.2 0.0 - 0.4 K/UL    ABS. BASOPHILS 0.0 0.0 - 0.06 K/UL    DF AUTOMATED     METABOLIC PANEL, BASIC    Collection Time: 12/29/17  9:00 AM   Result Value Ref Range    Sodium 138 136 - 145 mmol/L    Potassium 3.7 3.5 - 5.5 mmol/L    Chloride 105 100 - 108 mmol/L    CO2 27 21 - 32 mmol/L    Anion gap 6 3.0 - 18 mmol/L    Glucose 97 74 - 99 mg/dL    BUN 13 7.0 - 18 MG/DL    Creatinine 0.71 0.6 - 1.3 MG/DL    BUN/Creatinine ratio 18 12 - 20      GFR est AA >60 >60 ml/min/1.73m2    GFR est non-AA >60 >60 ml/min/1.73m2    Calcium 8.3 (L) 8.5 - 10.1 MG/DL   HEPATIC FUNCTION PANEL    Collection Time: 12/29/17  9:00 AM   Result Value Ref Range    Protein, total 7.6 6.4 - 8.2 g/dL    Albumin 3.9 3.4 - 5.0 g/dL    Globulin 3.7 2.0 - 4.0 g/dL    A-G Ratio 1.1 0.8 - 1.7      Bilirubin, total 0.3 0.2 - 1.0 MG/DL    Bilirubin, direct <0.1 0.0 - 0.2 MG/DL    Alk.  phosphatase 52 45 - 117 U/L    AST (SGOT) 19 15 - 37 U/L    ALT (SGPT) 40 13 - 56 U/L   LIPASE    Collection Time: 12/29/17  9:00 AM   Result Value Ref Range    Lipase 180 73 - 393 U/L   URINALYSIS W/ RFLX MICROSCOPIC    Collection Time: 12/29/17 10:00 AM   Result Value Ref Range    Color YELLOW      Appearance CLOUDY      Specific gravity 1.021 1.005 - 1.030      pH (UA) 5.0 5.0 - 8.0      Protein NEGATIVE  NEG mg/dL    Glucose NEGATIVE  NEG mg/dL    Ketone NEGATIVE  NEG mg/dL    Bilirubin NEGATIVE  NEG      Blood NEGATIVE  NEG      Urobilinogen 0.2 0.2 - 1.0 EU/dL    Nitrites NEGATIVE  NEG      Leukocyte Esterase SMALL (A) NEG     HCG URINE, QL    Collection Time: 12/29/17 10:00 AM   Result Value Ref Range    HCG urine, Ql. NEGATIVE  NEG     URINE MICROSCOPIC ONLY    Collection Time: 12/29/17 10:00 AM   Result Value Ref Range    WBC 4 to 10 0 - 4 /hpf    RBC 0 to 1 0 - 5 /hpf    Epithelial cells 3+ 0 - 5 /lpf    Bacteria 1+ (A) NEG /hpf    Trichomonas FEW (A) NEG

## 2017-12-29 NOTE — ED PROVIDER NOTES
HPI Comments: 8:53 AM   27 y.o. female presents to ED C/O abnormal labs, abdominal pain. Patient has a HX of anemia, miscarriage, ADHD, depression, abdominal surgery. Patient reports she donated plasma, last week when she went back this week to donate again they told her her labs are concerning for hep C but they wouldn't know true results for 3 weeks. Patient denies V/D, dysuria, CP, SOB. Patient reports lower abdominal pain x 2 days but feels like her menses is about to start, and associated mild nausea with his consistent with previous menses symptoms. Patient denies IV drug use but reports unprotected sex with multiple partners. Patient is a some day drinker. Patient smokes 1/2ppd, LMP 12/1  Patient denies any other symptoms or complaints. The history is provided by the patient. History limited by: No language barrier. Past Medical History:   Diagnosis Date    ADHD (attention deficit hyperactivity disorder)     Anemia     Bipolar depression ([facility] [address]     Depression     Miscarriage 09/2016       Past Surgical History:   Procedure Laterality Date    ABDOMEN SURGERY PROC UNLISTED      HX GI      HX OTHER SURGICAL      rectal surgery for abscess    HX OTHER SURGICAL      diverticulum near ovary removed, transvaginal         Family History:   Problem Relation Age of Onset    Hypertension Mother     Diabetes Mother     Diabetes Father     Hypertension Father     Stroke Maternal Grandmother        Social History     Social History    Marital status: SINGLE     Spouse name: N/A    Number of children: N/A    Years of education: N/A     Occupational History    Not on file. Social History Main Topics    Smoking status: Former Smoker     Packs/day: 0.25     Years: 10.00     Quit date: 3/17/2017    Smokeless tobacco: Never Used      Comment: 2 cigarrettes/day.  pt wants to quit    Alcohol use Yes      Comment: ocasionally    Drug use: No    Sexual activity: Yes     Partners: Male Birth control/ protection: None     Other Topics Concern    Not on file     Social History Narrative         ALLERGIES: Peanut    Review of Systems   Constitutional: Negative for appetite change and fever. HENT: Negative for congestion, rhinorrhea and sore throat. Respiratory: Negative for cough, shortness of breath and wheezing. Cardiovascular: Negative for chest pain and leg swelling. Gastrointestinal: Positive for abdominal pain and nausea. Negative for constipation, diarrhea and vomiting. Genitourinary: Negative for dysuria. Musculoskeletal: Negative for arthralgias and back pain. Neurological: Negative for dizziness, syncope and headaches. All other systems reviewed and are negative. Vitals:    12/29/17 0848   BP: (!) 137/97   Pulse: 78   Resp: 16   Temp: 98.6 °F (37 °C)   SpO2: 100%   Weight: 95.3 kg (210 lb)   Height: 5' 5\" (1.651 m)            Physical Exam   Constitutional: She is oriented to person, place, and time. She appears well-developed and well-nourished. No distress. HENT:   Head: Atraumatic. Eyes: Conjunctivae and EOM are normal. Pupils are equal, round, and reactive to light. No jaundice noted   Cardiovascular: Normal rate, regular rhythm and normal heart sounds. Pulmonary/Chest: Effort normal and breath sounds normal. No respiratory distress. She has no wheezes. She has no rales. Abdominal: Soft. Normal appearance and bowel sounds are normal. There is no hepatosplenomegaly. There is no tenderness. There is no rigidity, no rebound, no guarding and no CVA tenderness. Musculoskeletal: Normal range of motion. Neurological: She is alert and oriented to person, place, and time. She exhibits normal muscle tone. Coordination normal.   Skin: Skin is warm and dry. No rash noted. She is not diaphoretic. No erythema. No pallor. Nursing note and vitals reviewed.        MDM  Number of Diagnoses or Management Options  Elevated blood pressure reading:   Trichimoniasis: Diagnosis management comments: Differential Diagnosis: biliary disease, hepatitis, pancreatitis, PUD, gastritis, gastroenteritis, hiatal hernia, constipation, SBO, LBO, mesenteric ischemia/infarction,  ruptured AAA, renal colic, IBS, IBD, Celiac disease, PNA, AMI, PE, splenic abscess/infarction, malignancy, PID([facility])    MDM:  Plan: cbc, CMP, lipase, UA, HCG  Progress - small leuks, low clinical concern for UTI - no dysuria, or flank pain, urine appears contaminated, trich noted, will treat, no complaint of discharge. No abnormal LFT or lipase findings, CBC  11:27 AM patient informed of results. Will treat for trich. Patient educated she must have partner treated, and wait 10 days after last person treated. Patient has no abnormal LFT findings, no upper abdominal pain, low cliical concern for Hep C, patient referred to PCP as needed, educated about lab findings not conclusive but low clinical concern. Patient treated for trich prior to discharge. Patient educated to return to the ED for any new or worsening symptoms. Questions denied        Amount and/or Complexity of Data Reviewed  Clinical lab tests: ordered and reviewed      ED Course       Procedures             RESULTS:    No orders to display       Labs Reviewed   CBC WITH AUTOMATED DIFF - Abnormal; Notable for the following:        Result Value    HGB 11.9 (*)     MCH 23.9 (*)     MPV 12.1 (*)     ABS.  LYMPHOCYTES 3.7 (*)     All other components within normal limits   METABOLIC PANEL, BASIC - Abnormal; Notable for the following:     Calcium 8.3 (*)     All other components within normal limits   URINALYSIS W/ RFLX MICROSCOPIC - Abnormal; Notable for the following:     Leukocyte Esterase SMALL (*)     All other components within normal limits   URINE MICROSCOPIC ONLY - Abnormal; Notable for the following:     Bacteria 1+ (*)     Trichomonas FEW (*)     All other components within normal limits   HEPATIC FUNCTION PANEL   LIPASE   HCG URINE, QL Recent Results (from the past 12 hour(s))   CBC WITH AUTOMATED DIFF    Collection Time: 12/29/17  9:00 AM   Result Value Ref Range    WBC 7.6 4.6 - 13.2 K/uL    RBC 4.97 4.20 - 5.30 M/uL    HGB 11.9 (L) 12.0 - 16.0 g/dL    HCT 37.6 35.0 - 45.0 %    MCV 75.7 74.0 - 97.0 FL    MCH 23.9 (L) 24.0 - 34.0 PG    MCHC 31.6 31.0 - 37.0 g/dL    RDW 14.3 11.6 - 14.5 %    PLATELET 688 354 - 187 K/uL    MPV 12.1 (H) 9.2 - 11.8 FL    NEUTROPHILS 43 40 - 73 %    LYMPHOCYTES 48 21 - 52 %    MONOCYTES 6 3 - 10 %    EOSINOPHILS 3 0 - 5 %    BASOPHILS 0 0 - 2 %    ABS. NEUTROPHILS 3.3 1.8 - 8.0 K/UL    ABS. LYMPHOCYTES 3.7 (H) 0.9 - 3.6 K/UL    ABS. MONOCYTES 0.5 0.05 - 1.2 K/UL    ABS. EOSINOPHILS 0.2 0.0 - 0.4 K/UL    ABS. BASOPHILS 0.0 0.0 - 0.06 K/UL    DF AUTOMATED     METABOLIC PANEL, BASIC    Collection Time: 12/29/17  9:00 AM   Result Value Ref Range    Sodium 138 136 - 145 mmol/L    Potassium 3.7 3.5 - 5.5 mmol/L    Chloride 105 100 - 108 mmol/L    CO2 27 21 - 32 mmol/L    Anion gap 6 3.0 - 18 mmol/L    Glucose 97 74 - 99 mg/dL    BUN 13 7.0 - 18 MG/DL    Creatinine 0.71 0.6 - 1.3 MG/DL    BUN/Creatinine ratio 18 12 - 20      GFR est AA >60 >60 ml/min/1.73m2    GFR est non-AA >60 >60 ml/min/1.73m2    Calcium 8.3 (L) 8.5 - 10.1 MG/DL   HEPATIC FUNCTION PANEL    Collection Time: 12/29/17  9:00 AM   Result Value Ref Range    Protein, total 7.6 6.4 - 8.2 g/dL    Albumin 3.9 3.4 - 5.0 g/dL    Globulin 3.7 2.0 - 4.0 g/dL    A-G Ratio 1.1 0.8 - 1.7      Bilirubin, total 0.3 0.2 - 1.0 MG/DL    Bilirubin, direct <0.1 0.0 - 0.2 MG/DL    Alk.  phosphatase 52 45 - 117 U/L    AST (SGOT) 19 15 - 37 U/L    ALT (SGPT) 40 13 - 56 U/L   LIPASE    Collection Time: 12/29/17  9:00 AM   Result Value Ref Range    Lipase 180 73 - 393 U/L   URINALYSIS W/ RFLX MICROSCOPIC    Collection Time: 12/29/17 10:00 AM   Result Value Ref Range    Color YELLOW      Appearance CLOUDY      Specific gravity 1.021 1.005 - 1.030      pH (UA) 5.0 5.0 - 8.0 Protein NEGATIVE  NEG mg/dL    Glucose NEGATIVE  NEG mg/dL    Ketone NEGATIVE  NEG mg/dL    Bilirubin NEGATIVE  NEG      Blood NEGATIVE  NEG      Urobilinogen 0.2 0.2 - 1.0 EU/dL    Nitrites NEGATIVE  NEG      Leukocyte Esterase SMALL (A) NEG     HCG URINE, QL    Collection Time: 12/29/17 10:00 AM   Result Value Ref Range    HCG urine, Ql. NEGATIVE  NEG     URINE MICROSCOPIC ONLY    Collection Time: 12/29/17 10:00 AM   Result Value Ref Range    WBC 4 to 10 0 - 4 /hpf    RBC 0 to 1 0 - 5 /hpf    Epithelial cells 3+ 0 - 5 /lpf    Bacteria 1+ (A) NEG /hpf    Trichomonas FEW (A) NEG         PROGRESS NOTE:   8:53 AM   Initial assessment completed. Written by Amarilis PATEL    One or more blood pressure readings were noted elevated during the Pt's presentation in the emergency department this date. This abnormal reading has been cited in the Pt's diagnosis, and they have been encouraged to follow up with their primary care physician, or referred to a consultant for further evaluation and treatment. DISCHARGE NOTE:  11:33 AM   Cassidy Schwartz's  results have been reviewed with her. She has been counseled regarding her diagnosis, treatment, and plan. She verbally conveys understanding and agreement of the signs, symptoms, diagnosis, treatment and prognosis and additionally agrees to follow up as discussed. She also agrees with the care-plan and conveys that all of her questions have been answered. I have also provided discharge instructions for her that include: educational information regarding their diagnosis and treatment, and list of reasons why they would want to return to the ED prior to their follow-up appointment, should her condition change. CLINICAL IMPRESSION:    1. Trichimoniasis    2.  Elevated blood pressure reading        AFTER VISIT PLAN:    Current Discharge Medication List           Follow-up Information     Follow up With Details Comments Contact Denise Bonilla MD Schedule an appointment as soon as possible for a visit in 1 week Further evaluation [id number] [address] Schedule an appointment as soon as possible for a visit in 1 week Further evaluation [facility]  [phone]           Written by Julita PATEL

## 2017-12-29 NOTE — ED NOTES
Purposeful rounding completed:    Side rails up x 1:  YES  Bed in low position and wheels locked: YES  Call bell within reach: YES  Comfort addressed: YES    Toileting needs addressed: YES  Plan of care reviewed/updated with patient and or family members: YES  IV site assessed: YES  Pain assessed and addressed: YES, 2

## 2018-01-10 ENCOUNTER — TELEPHONE (OUTPATIENT)
Dept: INTERNAL MEDICINE CLINIC | Age: 33
End: 2018-01-10

## 2018-01-10 DIAGNOSIS — F90.9 ATTENTION DEFICIT HYPERACTIVITY DISORDER (ADHD), UNSPECIFIED ADHD TYPE: ICD-10-CM

## 2018-01-10 DIAGNOSIS — F31.9 BIPOLAR AFFECTIVE DISORDER, REMISSION STATUS UNSPECIFIED (HCC): Primary | ICD-10-CM

## 2018-01-10 NOTE — TELEPHONE ENCOUNTER
Patient requesting new referral to psychiatry. Previous specialty physician has moved. Patient prefers location in [place].

## 2018-01-10 NOTE — TELEPHONE ENCOUNTER
Referral generated for Dr. Idalia Garcia. She needs to contact her office to make an appt. Dr. [facility]       Primary Contact Information      Phone Fax E-mail Address     [phone] Not available.  [address] [place] [address]

## 2018-01-10 NOTE — TELEPHONE ENCOUNTER
Unsuccessful attempt to reach pt for below. Left message for her to call back at her earliest convenience.

## 2018-01-22 DIAGNOSIS — J30.89 CHRONIC NON-SEASONAL ALLERGIC RHINITIS, UNSPECIFIED TRIGGER: Primary | ICD-10-CM

## 2018-01-22 RX ORDER — LEVOCETIRIZINE DIHYDROCHLORIDE 5 MG/1
5 TABLET, FILM COATED ORAL DAILY
Qty: 30 TAB | Refills: 3 | Status: SHIPPED | OUTPATIENT
Start: 2018-01-22 | End: 2020-05-16

## 2018-01-22 RX ORDER — FLUTICASONE PROPIONATE 50 MCG
SPRAY, SUSPENSION (ML) NASAL
Qty: 1 BOTTLE | Refills: 2 | Status: SHIPPED | OUTPATIENT
Start: 2018-01-22 | End: 2018-01-30

## 2018-01-22 NOTE — TELEPHONE ENCOUNTER
2 pt. Identifiers confirmed. Pt. Notified of below. She also stated that she would like a refill on the xyzal and flonase as she has a lot of congestion causing her to have HA's. She requests that the Rx's be sent to [facility] on [address]. No other questions/concerns at this time.

## 2018-01-30 ENCOUNTER — HOSPITAL ENCOUNTER (OUTPATIENT)
Dept: LAB | Age: 33
Discharge: HOME | End: 2018-01-30

## 2018-01-30 ENCOUNTER — OFFICE VISIT (OUTPATIENT)
Dept: INTERNAL MEDICINE CLINIC | Age: 33
End: 2018-01-30

## 2018-01-30 VITALS
SYSTOLIC BLOOD PRESSURE: 137 MMHG | HEIGHT: 65 IN | WEIGHT: 214 LBS | HEART RATE: 87 BPM | BODY MASS INDEX: 35.65 KG/M2 | TEMPERATURE: 98 F | DIASTOLIC BLOOD PRESSURE: 89 MMHG | RESPIRATION RATE: 18 BRPM | OXYGEN SATURATION: 97 %

## 2018-01-30 DIAGNOSIS — R76.8 POSITIVE HEPATITIS C ANTIBODY TEST: ICD-10-CM

## 2018-01-30 DIAGNOSIS — R05.9 COUGH: ICD-10-CM

## 2018-01-30 DIAGNOSIS — F31.9 BIPOLAR AFFECTIVE DISORDER, REMISSION STATUS UNSPECIFIED (HCC): Primary | ICD-10-CM

## 2018-01-30 DIAGNOSIS — J40 BRONCHITIS: ICD-10-CM

## 2018-01-30 PROCEDURE — 99001 SPECIMEN HANDLING PT-LAB: CPT | Performed by: INTERNAL MEDICINE

## 2018-01-30 RX ORDER — ALBUTEROL SULFATE 90 UG/1
AEROSOL, METERED RESPIRATORY (INHALATION)
Qty: 1 INHALER | Refills: 0 | Status: SHIPPED | OUTPATIENT
Start: 2018-01-30 | End: 2020-05-16

## 2018-01-30 RX ORDER — AZITHROMYCIN 250 MG/1
TABLET, FILM COATED ORAL
Qty: 6 TAB | Refills: 0 | Status: SHIPPED | OUTPATIENT
Start: 2018-01-30 | End: 2018-02-04

## 2018-01-30 RX ORDER — BENZONATATE 100 MG/1
100 CAPSULE ORAL
Qty: 15 CAP | Refills: 0 | Status: SHIPPED | OUTPATIENT
Start: 2018-01-30 | End: 2020-05-16

## 2018-01-30 RX ORDER — BUDESONIDE AND FORMOTEROL FUMARATE DIHYDRATE 80; 4.5 UG/1; UG/1
2 AEROSOL RESPIRATORY (INHALATION) 2 TIMES DAILY
Qty: 1 INHALER | Refills: 0 | Status: SHIPPED | OUTPATIENT
Start: 2018-01-30 | End: 2020-05-16

## 2018-01-30 NOTE — PATIENT INSTRUCTIONS
1)follow-up in 2 weeks or sooner if worsening symptoms. Bronchitis: Care Instructions  Your Care Instructions    Bronchitis is inflammation of the bronchial tubes, which carry air to the lungs. The tubes swell and produce mucus, or phlegm. The mucus and inflamed bronchial tubes make you cough. You may have trouble breathing. Most cases of bronchitis are caused by viruses like those that cause colds. Antibiotics usually do not help and they may be harmful. Bronchitis usually develops rapidly and lasts about 2 to 3 weeks in otherwise healthy people. Follow-up care is a key part of your treatment and safety. Be sure to make and go to all appointments, and call your doctor if you are having problems. It's also a good idea to know your test results and keep a list of the medicines you take. How can you care for yourself at home? · Take all medicines exactly as prescribed. Call your doctor if you think you are having a problem with your medicine. · Get some extra rest.  · Take an over-the-counter pain medicine, such as acetaminophen (Tylenol), ibuprofen (Advil, Motrin), or naproxen (Aleve) to reduce fever and relieve body aches. Read and follow all instructions on the label. · Do not take two or more pain medicines at the same time unless the doctor told you to. Many pain medicines have acetaminophen, which is Tylenol. Too much acetaminophen (Tylenol) can be harmful. · Take an over-the-counter cough medicine that contains dextromethorphan to help quiet a dry, hacking cough so that you can sleep. Avoid cough medicines that have more than one active ingredient. Read and follow all instructions on the label. · Breathe moist air from a humidifier, hot shower, or sink filled with hot water. The heat and moisture will thin mucus so you can cough it out. · Do not smoke. Smoking can make bronchitis worse. If you need help quitting, talk to your doctor about stop-smoking programs and medicines.  These can increase your chances of quitting for good. When should you call for help? Call 911 anytime you think you may need emergency care. For example, call if:  ? · You have severe trouble breathing. ?Call your doctor now or seek immediate medical care if:  ? · You have new or worse trouble breathing. ? · You cough up dark brown or bloody mucus (sputum). ? · You have a new or higher fever. ? · You have a new rash. ? Watch closely for changes in your health, and be sure to contact your doctor if:  ? · You cough more deeply or more often, especially if you notice more mucus or a change in the color of your mucus. ? · You are not getting better as expected. Where can you learn more? Go to http://kaya-bernard.info/. Enter H333 in the search box to learn more about \"Bronchitis: Care Instructions. \"  Current as of: May 12, 2017  Content Version: 11.4  © 0750-4096 Healthwise, [employer]. Care instructions adapted under license by [employer] (which disclaims liability or warranty for this information). If you have questions about a medical condition or this instruction, always ask your healthcare professional. [address] any warranty or liability for your use of this information.

## 2018-01-30 NOTE — PROGRESS NOTES
Chief Complaint   Patient presents with    Cough    Referral Request     psychiatry       HPI:     Kasie Duff is a 27 y.o.  female with history of   here for the above complaint. She was treated for acute bronchitis. She is still having cough for 1.5 months. She went to [facility] ER and records not available. She was given albuterol prn in [place]. She does not remember antibiotic. She has shortness of breath and wheezing. She denies chest pain. No fevers, chills, night sweats. Cough productive of thick white mucus. Bipolar disorder: pt needs to see psychiatry. She is bipolar. Dr. Margie Sorensen does not accept her insurance and no accepting new pts. She denies any suicidal or homicidal ideations. She wants to donate plasma, but she said that she was told positive for hep C. Don't see in chart. Past Medical History:   Diagnosis Date    ADHD (attention deficit hyperactivity disorder)     Anemia     Bipolar depression ([facility]     Depression     Miscarriage 09/2016     Past Surgical History:   Procedure Laterality Date    ABDOMEN SURGERY PROC UNLISTED      HX GI      HX OTHER SURGICAL      rectal surgery for abscess    HX OTHER SURGICAL      diverticulum near ovary removed, transvaginal     Current Outpatient Prescriptions   Medication Sig    albuterol (PROVENTIL HFA, VENTOLIN HFA, PROAIR HFA) 90 mcg/actuation inhaler Take 1-2 puffs every 4-6 hrs prn shortness of breath    budesonide-formoterol (SYMBICORT) 80-4.5 mcg/actuation HFAA Take 2 Puffs by inhalation two (2) times a day.  azithromycin (ZITHROMAX) 250 mg tablet Take 2 tablets today, then take 1 tablet daily    benzonatate (TESSALON) 100 mg capsule Take 1 Cap by mouth three (3) times daily as needed for Cough.  levocetirizine (XYZAL) 5 mg tablet Take 1 Tab by mouth daily.  risperiDONE (RISPERDAL) 0.25 mg tablet Take 0.25 mg by mouth nightly as needed. No current facility-administered medications for this visit. Health Maintenance   Topic Date Due    Influenza Age 5 to Adult  08/01/2017    PAP AKA CERVICAL CYTOLOGY  05/17/2020    DTaP/Tdap/Td series (2 - Td) 12/08/2026       There is no immunization history on file for this patient. Patient's last menstrual period was 01/01/2018. Allergies and Intolerances: Allergies   Allergen Reactions    Peanut Hives       Family History:   Family History   Problem Relation Age of Onset    Hypertension Mother     Diabetes Mother     Diabetes Father     Hypertension Father     Stroke Maternal Grandmother        Social History:   She  reports that she has been smoking. She has a 2.50 pack-year smoking history. She has never used smokeless tobacco.  She  reports that she drinks alcohol. OBJECTIVE:   Physical exam:   Visit Vitals    /89 (BP 1 Location: Right arm, BP Patient Position: Sitting)    Pulse 87    Temp 98 °F (36.7 °C) (Oral)    Resp 18    Ht 5' 5\" (1.651 m)    Wt 214 lb (97.1 kg)    LMP 01/01/2018    SpO2 97%    BMI 35.61 kg/m2        Generally: Pleasant female in no acute distress  HEENT Exam: Head: atraumatic, acephalic                Eyes: PERRLA     Ears: bilaterally normal TM, no erythema or exudate, normal light reflex    Nares: mucosal membranes moist, no erythema    Mouth: Clear, no erythema or exudate    Neck: supple, no LAD    Cardiac Exam: regular, rate, and rhythm. Normal S1 and S2. No murmurs, gallops, or rubs  Pulmonary exam: Clear to auscultation bilaterally  Abdominal exam: Positive bowel sounds in all four quadrants, soft, nondistended, nontender  Extremities: 2+ dorsalis pedis pulses bilaterally.  No pedal edema    bilaterally    LABS/RADIOLOGICAL TESTS:  Lab Results   Component Value Date/Time    WBC 7.6 12/29/2017 09:00 AM    HGB 11.9 12/29/2017 09:00 AM    HCT 37.6 12/29/2017 09:00 AM    PLATELET 279 28/10/8725 09:00 AM     Lab Results   Component Value Date/Time    Sodium 138 12/29/2017 09:00 AM    Potassium 3.7 12/29/2017 09:00 AM    Chloride 105 12/29/2017 09:00 AM    CO2 27 12/29/2017 09:00 AM    Glucose 97 12/29/2017 09:00 AM    BUN 13 12/29/2017 09:00 AM    Creatinine 0.71 12/29/2017 09:00 AM     No results found for: CHOL, CHOLX, CHLST, CHOLV, HDL, LDL, LDLC, DLDLP, TGLX, TRIGL, TRIGP  No results found for: GPT    Previous labs    ASSESSMENT/PLAN:    1. Bipolar affective disorder, remission status unspecified ([facility]  -     REFERRAL TO PSYCHIATRY    2. Bronchitis: will given another z-pack and give symbicort and albuterol. Tessalon perles for cough. Will do cxr to r/o pneumonia as this has been going on for a while. -     albuterol (PROVENTIL HFA, VENTOLIN HFA, PROAIR HFA) 90 mcg/actuation inhaler; Take 1-2 puffs every 4-6 hrs prn shortness of breath  -     budesonide-formoterol (SYMBICORT) 80-4.5 mcg/actuation HFAA; Take 2 Puffs by inhalation two (2) times a day. -     azithromycin (ZITHROMAX) 250 mg tablet; Take 2 tablets today, then take 1 tablet daily    3. Cough  -     XR CHEST PA LAT; Future  -     benzonatate (TESSALON) 100 mg capsule; Take 1 Cap by mouth three (3) times daily as needed for Cough. 4. Positive hepatitis C antibody test  -     HEPATITIS C AB; Future    5. Requested Prescriptions     Signed Prescriptions Disp Refills    albuterol (PROVENTIL HFA, VENTOLIN HFA, PROAIR HFA) 90 mcg/actuation inhaler 1 Inhaler 0     Sig: Take 1-2 puffs every 4-6 hrs prn shortness of breath    budesonide-formoterol (SYMBICORT) 80-4.5 mcg/actuation HFAA 1 Inhaler 0     Sig: Take 2 Puffs by inhalation two (2) times a day.  azithromycin (ZITHROMAX) 250 mg tablet 6 Tab 0     Sig: Take 2 tablets today, then take 1 tablet daily    benzonatate (TESSALON) 100 mg capsule 15 Cap 0     Sig: Take 1 Cap by mouth three (3) times daily as needed for Cough. 6. Patient verbalized understanding and agreement with the plan. 7. Patient was given an after-visit summary.     8. Follow-up Disposition:  Return if symptoms worsen or fail to improve. or sooner if worsening symptoms.           Bridget Tobar M.D.

## 2018-01-30 NOTE — MR AVS SNAPSHOT
[facility] 
 
 
 [address] [facility] [address] 
[phone] Patient: Donato Muñiz MRN: [id number] Visit Information Date & Time Provider Department Dept. Phone Encounter #  
 1/30/2018 10:00 AM Chelsy Lawrence MD [employer] [phone] [id number] Follow-up Instructions Return if symptoms worsen or fail to improve. Upcoming Health Maintenance Date Due Influenza Age 5 to Adult 8/1/2017 PAP AKA CERVICAL CYTOLOGY 5/17/2020 DTaP/Tdap/Td series (2 - Td) 12/8/2026 Allergies as of 1/30/2018  Review Complete On: 1/30/2018 By: Chelsy Lawrence MD  
  
 Severity Noted Reaction Type Reactions Peanut  12/16/2014    Hives Current Immunizations  Reviewed on 1/30/2018 No immunizations on file. Reviewed by Ernesto Menard PHARMD on 1/30/2018 at  8:35 AM  
You Were Diagnosed With   
  
 Codes Comments Bipolar affective disorder, remission status unspecified ([facility]    -  Primary ICD-10-CM: F31.9 ICD-9-CM: 296.80 Bronchitis     ICD-10-CM: J40 ICD-9-CM: 266 Cough     ICD-10-CM: R05 ICD-9-CM: 303. 2 Vitals BP Pulse Temp Resp Height(growth percentile) Weight(growth percentile) 137/89 (BP 1 Location: Right arm, BP Patient Position: Sitting) 87 98 °F (36.7 °C) (Oral) 18 5' 5\" (1.651 m) 214 lb (97.1 kg) LMP SpO2 BMI OB Status Smoking Status 01/01/2018 97% 35.61 kg/m2 Having regular periods Current Every Day Smoker Vitals History BMI and BSA Data Body Mass Index Body Surface Area  
 35.61 kg/m 2 2.11 m 2 Preferred Pharmacy Pharmacy Name Phone [facility] [address] [place] [address]. [address] [phone] Your Updated Medication List  
  
   
This list is accurate as of: 1/30/18 10:25 AM.  Always use your most recent med list.  
  
  
  
  
 albuterol 90 mcg/actuation inhaler Commonly known as:  PROVENTIL HFA, VENTOLIN HFA, PROAIR HFA Take 1-2 puffs every 4-6 hrs prn shortness of breath  
  
 azithromycin 250 mg tablet Commonly known as:  Avina Intercourse Take 2 tablets today, then take 1 tablet daily  
  
 benzonatate 100 mg capsule Commonly known as:  TESSALON Take 1 Cap by mouth three (3) times daily as needed for Cough. budesonide-formoterol 80-4.5 mcg/actuation Hfaa Commonly known as:  SYMBICORT Take 2 Puffs by inhalation two (2) times a day. levocetirizine 5 mg tablet Commonly known as:  Angelita  Take 1 Tab by mouth daily. risperiDONE 0.25 mg tablet Commonly known as:  RisperDAL Take 0.25 mg by mouth nightly as needed. Prescriptions Sent to Pharmacy Refills  
 albuterol (PROVENTIL HFA, VENTOLIN HFA, PROAIR HFA) 90 mcg/actuation inhaler 0 Sig: Take 1-2 puffs every 4-6 hrs prn shortness of breath Class: Normal  
 Pharmacy: [facility] [address]  AT [address] Ph #: [phone]  
 budesonide-formoterol (SYMBICORT) 80-4.5 mcg/actuation HFAA 0 Sig: Take 2 Puffs by inhalation two (2) times a day. Class: Normal  
 Pharmacy: [facility] Ph #: [phone] Route: Inhalation  
 azithromycin (ZITHROMAX) 250 mg tablet 0 Sig: Take 2 tablets today, then take 1 tablet daily Class: Normal  
 Pharmacy: [facility], [address] [place] [address]  AT [address] Ph #: [phone]  
 benzonatate (TESSALON) 100 mg capsule 0 Sig: Take 1 Cap by mouth three (3) times daily as needed for Cough. Class: Normal  
 Pharmacy: [facility], [address] [place] [address]. [address] Ph #: [phone] Route: Oral  
  
We Performed the Following REFERRAL TO PSYCHIATRY [REF91 Custom] Comments: Please evaluate for bipolar disorder in 1 week. Pt will make own appt. Follow-up Instructions Return if symptoms worsen or fail to improve. To-Do List   
 01/30/2018 Imaging:  XR CHEST PA LAT Referral Information Referral ID Referred By Referred To  
  
 [id number] [place], [address] [place] [address]   
   [facility] Phone: [phone] Fax: [phone] Visits Status Start Date End Date 1 New Request 1/30/18 1/30/19 If your referral has a status of pending review or denied, additional information will be sent to support the outcome of this decision. Patient Instructions 1)follow-up in 2 weeks or sooner if worsening symptoms. Bronchitis: Care Instructions Your Care Instructions Bronchitis is inflammation of the bronchial tubes, which carry air to the lungs. The tubes swell and produce mucus, or phlegm. The mucus and inflamed bronchial tubes make you cough. You may have trouble breathing. Most cases of bronchitis are caused by viruses like those that cause colds. Antibiotics usually do not help and they may be harmful. Bronchitis usually develops rapidly and lasts about 2 to 3 weeks in otherwise healthy people. Follow-up care is a key part of your treatment and safety. Be sure to make and go to all appointments, and call your doctor if you are having problems. It's also a good idea to know your test results and keep a list of the medicines you take. How can you care for yourself at home? · Take all medicines exactly as prescribed. Call your doctor if you think you are having a problem with your medicine. · Get some extra rest. 
· Take an over-the-counter pain medicine, such as acetaminophen (Tylenol), ibuprofen (Advil, Motrin), or naproxen (Aleve) to reduce fever and relieve body aches. Read and follow all instructions on the label. · Do not take two or more pain medicines at the same time unless the doctor told you to. Many pain medicines have acetaminophen, which is Tylenol. Too much acetaminophen (Tylenol) can be harmful. · Take an over-the-counter cough medicine that contains dextromethorphan to help quiet a dry, hacking cough so that you can sleep. Avoid cough medicines that have more than one active ingredient. Read and follow all instructions on the label. · Breathe moist air from a humidifier, hot shower, or sink filled with hot water. The heat and moisture will thin mucus so you can cough it out. · Do not smoke. Smoking can make bronchitis worse. If you need help quitting, talk to your doctor about stop-smoking programs and medicines. These can increase your chances of quitting for good. When should you call for help? Call 911 anytime you think you may need emergency care. For example, call if: 
? · You have severe trouble breathing. ?Call your doctor now or seek immediate medical care if: 
? · You have new or worse trouble breathing. ? · You cough up dark brown or bloody mucus (sputum). ? · You have a new or higher fever. ? · You have a new rash. ? Watch closely for changes in your health, and be sure to contact your doctor if: 
? · You cough more deeply or more often, especially if you notice more mucus or a change in the color of your mucus. ? · You are not getting better as expected. Where can you learn more? Go to http://kaya-bernard.info/. Enter H333 in the search box to learn more about \"Bronchitis: Care Instructions. \" Current as of: May 12, 2017 Content Version: 11.4 © 1482-3359 [employer]. Care instructions adapted under license by [employer] (which disclaims liability or warranty for this information).  If you have questions about a medical condition or this instruction, always ask your healthcare professional. [employer] disclaims any warranty or liability for your use of this information. Introducing [facility]! Dear Josh Fletcher: Thank you for requesting a [facility] account. Our records indicate that you already have an active [facility] account. You can access your account anytime at [email or web address] Did you know that you can access your hospital and ER discharge instructions at any time in [facility]? You can also review all of your test results from your hospital stay or ER visit. Additional Information If you have questions, please visit the Frequently Asked Questions section of the [facility] website at [email or web address] Remember, [facility] is NOT to be used for urgent needs. For medical emergencies, dial 911. Now available from your iPhone and Android! Please provide this summary of care documentation to your next provider. Your primary care clinician is listed as Sriram Alberts. If you have any questions after today's visit, please call [phone].

## 2018-01-31 LAB — HCV AB S/CO SERPL IA: <0.1 S/CO RATIO (ref 0–0.9)

## 2018-03-16 ENCOUNTER — TELEPHONE (OUTPATIENT)
Dept: INTERNAL MEDICINE CLINIC | Age: 33
End: 2018-03-16

## 2018-03-16 DIAGNOSIS — F31.9 BIPOLAR AFFECTIVE DISORDER, REMISSION STATUS UNSPECIFIED (HCC): Primary | ICD-10-CM

## 2018-03-16 NOTE — TELEPHONE ENCOUNTER
Referral generated for [facility] at [phone]. She needs to make her own appt.

## 2018-03-16 NOTE — TELEPHONE ENCOUNTER
Patient notified that previous psychiatry referral is not accepting new clients. Patient requesting referral to [facility] at [phone].

## 2018-03-19 NOTE — TELEPHONE ENCOUNTER
Contacted behavioral health to inquire on Fax #. Given [phone]. Faxed order with conformation of receipt.

## 2018-03-19 NOTE — TELEPHONE ENCOUNTER
[facility]. Two patient Identifiers confirmed. Advised pt per Dr Sarath Foss notes. Pt verbalized understanding.

## 2018-03-19 NOTE — TELEPHONE ENCOUNTER
[facility] Two patient Identifiers confirmed. Advised pt paperwork faxed. Pt verbalized understanding.

## 2019-10-14 ENCOUNTER — HOSPITAL ENCOUNTER (EMERGENCY)
Age: 34
Discharge: HOME | End: 2019-10-14
Attending: EMERGENCY MEDICINE
Payer: SELF-PAY

## 2019-10-14 VITALS
HEART RATE: 66 BPM | TEMPERATURE: 98 F | OXYGEN SATURATION: 100 % | SYSTOLIC BLOOD PRESSURE: 125 MMHG | WEIGHT: 192 LBS | RESPIRATION RATE: 16 BRPM | HEIGHT: 65 IN | BODY MASS INDEX: 31.99 KG/M2 | DIASTOLIC BLOOD PRESSURE: 76 MMHG

## 2019-10-14 DIAGNOSIS — B96.89 BV (BACTERIAL VAGINOSIS): ICD-10-CM

## 2019-10-14 DIAGNOSIS — N76.0 BV (BACTERIAL VAGINOSIS): ICD-10-CM

## 2019-10-14 DIAGNOSIS — A59.01 TRICHOMONAS VAGINALIS (TV) INFECTION: Primary | ICD-10-CM

## 2019-10-14 LAB
APPEARANCE UR: ABNORMAL
BACTERIA URNS QL MICRO: ABNORMAL /HPF
BILIRUB UR QL: NEGATIVE
COLOR UR: YELLOW
EPITH CASTS URNS QL MICRO: ABNORMAL /LPF (ref 0–5)
GLUCOSE UR STRIP.AUTO-MCNC: NEGATIVE MG/DL
HCG UR QL: NEGATIVE
HGB UR QL STRIP: ABNORMAL
KETONES UR QL STRIP.AUTO: NEGATIVE MG/DL
LEUKOCYTE ESTERASE UR QL STRIP.AUTO: ABNORMAL
NITRITE UR QL STRIP.AUTO: NEGATIVE
PH UR STRIP: 5 [PH] (ref 5–8)
PROT UR STRIP-MCNC: NEGATIVE MG/DL
RBC #/AREA URNS HPF: ABNORMAL /HPF (ref 0–5)
SERVICE CMNT-IMP: NORMAL
SP GR UR REFRACTOMETRY: 1.01 (ref 1–1.03)
UROBILINOGEN UR QL STRIP.AUTO: 0.2 EU/DL (ref 0.2–1)
WBC URNS QL MICRO: ABNORMAL /HPF (ref 0–4)
WET PREP GENITAL: NORMAL

## 2019-10-14 PROCEDURE — 74011250637 HC RX REV CODE- 250/637: Performed by: PHYSICIAN ASSISTANT

## 2019-10-14 PROCEDURE — 87491 CHLMYD TRACH DNA AMP PROBE: CPT

## 2019-10-14 PROCEDURE — 87210 SMEAR WET MOUNT SALINE/INK: CPT

## 2019-10-14 PROCEDURE — 74011250636 HC RX REV CODE- 250/636: Performed by: PHYSICIAN ASSISTANT

## 2019-10-14 PROCEDURE — 96372 THER/PROPH/DIAG INJ SC/IM: CPT

## 2019-10-14 PROCEDURE — 81025 URINE PREGNANCY TEST: CPT

## 2019-10-14 PROCEDURE — 74011000250 HC RX REV CODE- 250

## 2019-10-14 PROCEDURE — 99283 EMERGENCY DEPT VISIT LOW MDM: CPT

## 2019-10-14 PROCEDURE — 81001 URINALYSIS AUTO W/SCOPE: CPT

## 2019-10-14 RX ORDER — CEFTRIAXONE 250 MG/8ML
250 INJECTION, POWDER, FOR SOLUTION INTRAMUSCULAR; INTRAVENOUS
Status: COMPLETED | OUTPATIENT
Start: 2019-10-14 | End: 2019-10-14

## 2019-10-14 RX ORDER — METRONIDAZOLE 500 MG/1
500 TABLET ORAL 2 TIMES DAILY
Qty: 14 TAB | Refills: 0 | Status: SHIPPED | OUTPATIENT
Start: 2019-10-14 | End: 2019-10-21

## 2019-10-14 RX ORDER — AZITHROMYCIN 250 MG/1
1000 TABLET, FILM COATED ORAL
Status: COMPLETED | OUTPATIENT
Start: 2019-10-14 | End: 2019-10-14

## 2019-10-14 RX ORDER — WATER FOR INJECTION,STERILE
VIAL (ML) INJECTION
Status: COMPLETED
Start: 2019-10-14 | End: 2019-10-14

## 2019-10-14 RX ORDER — METRONIDAZOLE 250 MG/1
2000 TABLET ORAL
Status: DISCONTINUED | OUTPATIENT
Start: 2019-10-14 | End: 2019-10-14

## 2019-10-14 RX ORDER — METRONIDAZOLE 500 MG/1
500 TABLET ORAL 2 TIMES DAILY
Qty: 14 TAB | Refills: 0 | Status: SHIPPED | OUTPATIENT
Start: 2019-10-14 | End: 2019-10-14

## 2019-10-14 RX ADMIN — AZITHROMYCIN MONOHYDRATE 1000 MG: 250 TABLET ORAL at 14:24

## 2019-10-14 RX ADMIN — CEFTRIAXONE SODIUM 250 MG: 250 INJECTION, POWDER, FOR SOLUTION INTRAMUSCULAR; INTRAVENOUS at 14:25

## 2019-10-14 RX ADMIN — WATER 10 ML: 1 INJECTION INTRAMUSCULAR; INTRAVENOUS; SUBCUTANEOUS at 14:25

## 2019-10-14 NOTE — DISCHARGE INSTRUCTIONS
Patient Education     Please return immediately to the Emergency Room for re-evaluation if you are not improving, develop any new symptoms, or develop worsening of current symptoms! If you have been prescribed a medication and are unable to take this medication for any reason, please return to the Emergency Department for further evaluation! If you have been referred for follow-up to a specialist, but are unable to follow-up and your symptoms are either not improving or are worsening, please return to the Emergency Department for further evaluation! Bacterial Vaginosis: Care Instructions  Your Care Instructions    Bacterial vaginosis is a type of vaginal infection. It is caused by excess growth of certain bacteria that are normally found in the vagina. Symptoms can include itching, swelling, pain when you urinate or have sex, and a gray or yellow discharge with a \"fishy\" odor. It is not considered an infection that is spread through sexual contact. Although symptoms can be annoying and uncomfortable, bacterial vaginosis does not usually cause other health problems. However, if you have it while you are pregnant, it can cause complications. While the infection may go away on its own, most doctors use antibiotics to treat it. You may have been prescribed pills or vaginal cream. With treatment, bacterial vaginosis usually clears up in 5 to 7 days. Follow-up care is a key part of your treatment and safety. Be sure to make and go to all appointments, and call your doctor if you are having problems. It's also a good idea to know your test results and keep a list of the medicines you take. How can you care for yourself at home? · Take your antibiotics as directed. Do not stop taking them just because you feel better. You need to take the full course of antibiotics. · Do not eat or drink anything that contains alcohol if you are taking metronidazole (Flagyl).   · Keep using your medicine if you start your period. Use pads instead of tampons while using a vaginal cream or suppository. Tampons can absorb the medicine. · Wear loose cotton clothing. Do not wear nylon and other materials that hold body heat and moisture close to the skin. · Do not scratch. Relieve itching with a cold pack or a cool bath. · Do not wash your vaginal area more than once a day. Use plain water or a mild, unscented soap. Do not douche. When should you call for help? Watch closely for changes in your health, and be sure to contact your doctor if:    · You have unexpected vaginal bleeding.     · You have a fever.     · You have new or increased pain in your vagina or pelvis.     · You are not getting better after 1 week.     · Your symptoms return after you finish the course of your medicine. Where can you learn more? Go to http://kaya-bernard.info/. Olga Rodriguez in the search box to learn more about \"Bacterial Vaginosis: Care Instructions. \"  Current as of: February 19, 2019  Content Version: 12.2  © 8242-7151 [employer]. Care instructions adapted under license by [employer] (which disclaims liability or warranty for this information). If you have questions about a medical condition or this instruction, always ask your healthcare professional. [address] any warranty or liability for your use of this information. Patient Education        Trichomoniasis: Care Instructions  Your Care Instructions  Trichomoniasis is a sexually transmitted infection (STI) that is spread by having sex with an infected partner. Trichomoniasis is commonly called trich (say \"trick\"). In women, trich may cause vaginal itching and a smelly discharge. But in many cases, especially in men, there are no symptoms. Ernesto Feelpascale is treated so that you do not spread it to others. Both you and your sex partner or partners should be treated at the same time so you do not infect each other again.  Ernesto Ac may cause problems with pregnancy. Your doctor will talk with you about treatment for Trich if you are pregnant. Follow-up care is a key part of your treatment and safety. Be sure to make and go to all appointments, and call your doctor if you are having problems. It's also a good idea to know your test results and keep a list of the medicines you take. How can you care for yourself at home? · Take your antibiotics as directed. Do not stop taking them just because you feel better. You need to take the full course of antibiotics. · Do not have sex while you are being treated. If your doctor gave you a single dose of antibiotics, do not have sex for one week after being treated and until your partner also has been treated. · Tell your sex partner (or partners) that he or she will also need to be tested and treated. · Use a cold water compress or cool baths to relieve itching. To prevent trichomoniasis in the future  · Use latex condoms every time you have sex. Use them from the beginning to the end of sexual contact. · Talk to your partner before having sex. Find out if he or she has or is at risk for trich or any other STI. Keep in mind that a person may be able to spread an STI even if he or she does not have symptoms. · Do not have sex if you are being treated for trich or any other STI. · Do not have sex with anyone who has symptoms of an STI, such as sores on the genitals or mouth. · Having one sex partner (who does not have STIs and does not have sex with anyone else) is a good way to avoid STIs. When should you call for help?   Call your doctor now or seek immediate medical care if:    · You have unusual vaginal bleeding.     · You have a fever.     · You have new discharge from the vagina or penis.     · You have pelvic pain.    Watch closely for changes in your health, and be sure to contact your doctor if:    · You do not get better as expected.     · You have any new symptoms or your symptoms get worse.   Where can you learn more? Go to http://kaya-bernard.info/. Enter F582 in the search box to learn more about \"Trichomoniasis: Care Instructions. \"  Current as of: September 11, 2018  Content Version: 12.2  © 9927-9887 [employer]. Care instructions adapted under license by [employer] (which disclaims liability or warranty for this information). If you have questions about a medical condition or this instruction, always ask your healthcare professional. [address] any warranty or liability for your use of this information.

## 2019-10-14 NOTE — ED PROVIDER NOTES
EMERGENCY DEPARTMENT HISTORY AND PHYSICAL EXAM    1:59 PM      Date: 10/14/2019  Patient Name: Mazin Orozco    History of Presenting Illness     Chief Complaint   Patient presents with    Vaginal Itching       History Provided By: Patient    Chief Complaint: vaginal itching, concern for STI  Duration: 1 Weeks  Timing:  Acute  Location:   Quality: itching  Severity: 2 out of 10  Modifying Factors: none  Associated Symptoms: denies any other associated signs or symptoms      Additional History (Context):Ceferino Caceres is a 37 y.o. female with a pertinent history of ADHD, anemia, bipolar disorder, depression who presents to the emergency department for evaluation of vaginal itching without discharge x 1 week. No associated urinary symptoms. Pt is concerned about STI. No possibility of pregnancy. No recent fevers, chills, CP, SOB, n/v/d/c. No other concerns. PCP:  None        Current Outpatient Medications   Medication Sig Dispense Refill    metroNIDAZOLE (FLAGYL) 500 mg tablet Take 1 Tab by mouth two (2) times a day for 7 days. 14 Tab 0    albuterol (PROVENTIL HFA, VENTOLIN HFA, PROAIR HFA) 90 mcg/actuation inhaler Take 1-2 puffs every 4-6 hrs prn shortness of breath 1 Inhaler 0    budesonide-formoterol (SYMBICORT) 80-4.5 mcg/actuation HFAA Take 2 Puffs by inhalation two (2) times a day. 1 Inhaler 0    benzonatate (TESSALON) 100 mg capsule Take 1 Cap by mouth three (3) times daily as needed for Cough. 15 Cap 0    levocetirizine (XYZAL) 5 mg tablet Take 1 Tab by mouth daily. 30 Tab 3    risperiDONE (RISPERDAL) 0.25 mg tablet Take 0.25 mg by mouth nightly as needed.   0       Past History     Past Medical History:  Past Medical History:   Diagnosis Date    ADHD (attention deficit hyperactivity disorder)     Anemia     [facility] ([facility] [address]     Depression     Miscarriage 09/2016       Past Surgical History:  Past Surgical History:   Procedure Laterality Date    ABDOMEN SURGERY PROC UNLISTED      HX GI      HX OTHER SURGICAL      rectal surgery for abscess    HX OTHER SURGICAL      diverticulum near ovary removed, transvaginal       Family History:  Family History   Problem Relation Age of Onset    Hypertension Mother     Diabetes Mother     Diabetes Father     Hypertension Father     Stroke Maternal Grandmother        Social History:  Social History     Tobacco Use    Smoking status: Current Every Day Smoker     Packs/day: 0.25     Years: 10.00     Pack years: 2.50     Last attempt to quit: 3/17/2017     Years since quittin.5    Smokeless tobacco: Never Used    Tobacco comment: 2 cigarrettes/day. pt wants to quit   Substance Use Topics    Alcohol use: Yes     Comment: ocasionally    Drug use: No       Allergies: Allergies   Allergen Reactions    Peanut Hives         Review of Systems       Review of Systems   Constitutional: Negative for chills and fever. HENT: Negative for congestion, rhinorrhea and sore throat. Respiratory: Negative for cough and shortness of breath. Cardiovascular: Negative for chest pain. Gastrointestinal: Negative for abdominal pain, blood in stool, constipation, diarrhea, nausea and vomiting. Genitourinary: Positive for vaginal pain (itching). Negative for dysuria, frequency, hematuria, pelvic pain and vaginal discharge. Musculoskeletal: Negative for back pain and myalgias. Skin: Negative for rash and wound. Neurological: Negative for dizziness and headaches. All other systems reviewed and are negative. Physical Exam     Visit Vitals  /76 (BP 1 Location: Right arm, BP Patient Position: At rest)   Pulse 66   Temp 98 °F (36.7 °C)   Resp 16   Ht 5' 5\" (1.651 m)   Wt 87.1 kg (192 lb)   LMP 10/13/2019   SpO2 100%   BMI 31.95 kg/m²       Physical Exam   Constitutional: She is oriented to person, place, and time. She appears well-developed and well-nourished. No distress. HENT:   Head: Normocephalic and atraumatic.    Eyes: Pupils are equal, round, and reactive to light. Conjunctivae and EOM are normal. Right eye exhibits no discharge. Left eye exhibits no discharge. Neck: Normal range of motion. Neck supple. No thyromegaly present. Cardiovascular: Normal rate, regular rhythm and normal heart sounds. Pulmonary/Chest: Effort normal and breath sounds normal. No respiratory distress. She has no wheezes. She has no rales. She exhibits no tenderness. Abdominal: Soft. Bowel sounds are normal. She exhibits no distension. There is no tenderness. There is no rebound and no guarding. Genitourinary:   Genitourinary Comments: Pt self swabbed   Musculoskeletal: She exhibits no edema or deformity. Lymphadenopathy:     She has no cervical adenopathy. Neurological: She is alert and oriented to person, place, and time. She has normal reflexes. Skin: Skin is warm and dry. She is not diaphoretic. Psychiatric: She has a normal mood and affect. Nursing note and vitals reviewed.       Diagnostic Study Results     Labs -  Recent Results (from the past 12 hour(s))   URINALYSIS W/ RFLX MICROSCOPIC    Collection Time: 10/14/19 12:50 PM   Result Value Ref Range    Color YELLOW      Appearance CLOUDY      Specific gravity 1.012 1.005 - 1.030      pH (UA) 5.0 5.0 - 8.0      Protein NEGATIVE  NEG mg/dL    Glucose NEGATIVE  NEG mg/dL    Ketone NEGATIVE  NEG mg/dL    Bilirubin NEGATIVE  NEG      Blood MODERATE (A) NEG      Urobilinogen 0.2 0.2 - 1.0 EU/dL    Nitrites NEGATIVE  NEG      Leukocyte Esterase TRACE (A) NEG     HCG URINE, QL    Collection Time: 10/14/19 12:50 PM   Result Value Ref Range    HCG urine, QL NEGATIVE  NEG     URINE MICROSCOPIC ONLY    Collection Time: 10/14/19 12:50 PM   Result Value Ref Range    WBC 1 to 3 0 - 4 /hpf    RBC 2 to 4 0 - 5 /hpf    Epithelial cells 1+ 0 - 5 /lpf    Bacteria 2+ (A) NEG /hpf   WET PREP    Collection Time: 10/14/19  1:39 PM   Result Value Ref Range    Special Requests: NO SPECIAL REQUESTS      Wet prep MODERATE  MOTILE TRICHOMONAS NOTED        Wet prep MODERATE  CLUE CELLS PRESENT        Wet prep NO FUNGAL ELEMENTS SEEN         Radiologic Studies -   No results found. Medical Decision Making   I am the first provider for this patient. I reviewed the vital signs, available nursing notes, past medical history, past surgical history, family history and social history. Vital Signs-Reviewed the patient's vital signs. Pulse Oximetry Analysis -  100% on room air (Interpretation)    Records Reviewed: Nursing Notes and Old Medical Records (Time of Review: 1:59 PM)    ED Course: Progress Notes, Reevaluation, and Consults:    Provider Notes (Medical Decision Making):   Differential Diagnosis:  Candidiasis, trichomoniasis, bacterial vaginitis, GC/Chlamydia, pregnancy, urethritis/UTI, vaginal foreign body, atrophic vaginitis, contact vulvovaginitis, physiologic discharge    Plan:  Pt presents in NAD, vitals wnl. HPI consistent with yeast vaginitis. Treated empirically for NG/CT. Wet prep reveals trichomonas and BV. Will treat with outpatient BID flagyl x 7 days. UA slightly indicative of UTI - likely from vaginal infection. Will not initiate abx for UTI at this time. At this time, patient is stable and appropriate for discharge home. Patient demonstrates understanding of current diagnoses and is in agreement with the treatment plan. They are advised that while the likelihood of serious underlying condition is low at this point given the evaluation performed today, we cannot fully rule it out. They are advised to immediately return with any new symptoms or worsening of current condition. All questions have been answered. Patient is given educational material regarding their diagnoses, including danger symptoms and when to return to the ED. Diagnosis     Clinical Impression:   1. Trichomonas vaginalis (TV) infection    2.  BV (bacterial vaginosis)        Disposition: [place] Home    Follow-up Information     Follow up With Specialties Details Why Contact Walter Tolbert  Call For follow-up and primary care [place] [id number]  [phone]    [facility] EMERGENCY DEPT Emergency Medicine Go to As needed, If symptoms worsen [address]  [phone]           Patient's Medications   Start Taking    METRONIDAZOLE (FLAGYL) 500 MG TABLET    Take 1 Tab by mouth two (2) times a day for 7 days. Continue Taking    ALBUTEROL (PROVENTIL HFA, VENTOLIN HFA, PROAIR HFA) 90 MCG/ACTUATION INHALER    Take 1-2 puffs every 4-6 hrs prn shortness of breath    BENZONATATE (TESSALON) 100 MG CAPSULE    Take 1 Cap by mouth three (3) times daily as needed for Cough. BUDESONIDE-FORMOTEROL (SYMBICORT) 80-4.5 MCG/ACTUATION HFAA    Take 2 Puffs by inhalation two (2) times a day. LEVOCETIRIZINE (XYZAL) 5 MG TABLET    Take 1 Tab by mouth daily. RISPERIDONE (RISPERDAL) 0.25 MG TABLET    Take 0.25 mg by mouth nightly as needed. These Medications have changed    No medications on file   Stop Taking    No medications on file     _______________________________    This note was dictated utilizing voice recognition software which may lead to typographical errors. I apologize in advance if the situation occurs. If questions arise please do not hesitate to contact me or call our department.   Quan Barr PA-C

## 2019-10-15 LAB
C TRACH RRNA SPEC QL NAA+PROBE: NEGATIVE
N GONORRHOEA RRNA SPEC QL NAA+PROBE: NEGATIVE
SPECIMEN SOURCE: NORMAL

## 2020-05-16 ENCOUNTER — HOSPITAL ENCOUNTER (EMERGENCY)
Age: 35
Discharge: HOME | End: 2020-05-16
Attending: EMERGENCY MEDICINE
Payer: MEDICAID

## 2020-05-16 VITALS
HEIGHT: 65 IN | DIASTOLIC BLOOD PRESSURE: 89 MMHG | WEIGHT: 180 LBS | RESPIRATION RATE: 16 BRPM | HEART RATE: 54 BPM | OXYGEN SATURATION: 100 % | TEMPERATURE: 98.3 F | BODY MASS INDEX: 29.99 KG/M2 | SYSTOLIC BLOOD PRESSURE: 131 MMHG

## 2020-05-16 DIAGNOSIS — N89.8 VAGINAL DISCHARGE: ICD-10-CM

## 2020-05-16 DIAGNOSIS — Z20.2 EXPOSURE TO SYPHILIS: Primary | ICD-10-CM

## 2020-05-16 LAB
APPEARANCE UR: ABNORMAL
BACTERIA URNS QL MICRO: ABNORMAL /HPF
BILIRUB UR QL: NEGATIVE
COLOR UR: YELLOW
EPITH CASTS URNS QL MICRO: ABNORMAL /LPF (ref 0–5)
GLUCOSE UR STRIP.AUTO-MCNC: NEGATIVE MG/DL
HCG UR QL: NEGATIVE
HGB UR QL STRIP: NEGATIVE
KETONES UR QL STRIP.AUTO: NEGATIVE MG/DL
LEUKOCYTE ESTERASE UR QL STRIP.AUTO: ABNORMAL
NITRITE UR QL STRIP.AUTO: NEGATIVE
PH UR STRIP: 5 [PH] (ref 5–8)
PROT UR STRIP-MCNC: NEGATIVE MG/DL
RBC #/AREA URNS HPF: ABNORMAL /HPF (ref 0–5)
SERVICE CMNT-IMP: NORMAL
SP GR UR REFRACTOMETRY: 1.02 (ref 1–1.03)
TRICHOMONAS UR QL MICRO: ABNORMAL
UROBILINOGEN UR QL STRIP.AUTO: 1 EU/DL (ref 0.2–1)
WBC URNS QL MICRO: ABNORMAL /HPF (ref 0–4)
WET PREP GENITAL: NORMAL

## 2020-05-16 PROCEDURE — 87210 SMEAR WET MOUNT SALINE/INK: CPT

## 2020-05-16 PROCEDURE — 87389 HIV-1 AG W/HIV-1&-2 AB AG IA: CPT

## 2020-05-16 PROCEDURE — 81001 URINALYSIS AUTO W/SCOPE: CPT

## 2020-05-16 PROCEDURE — 74011250637 HC RX REV CODE- 250/637: Performed by: EMERGENCY MEDICINE

## 2020-05-16 PROCEDURE — 87491 CHLMYD TRACH DNA AMP PROBE: CPT

## 2020-05-16 PROCEDURE — 99283 EMERGENCY DEPT VISIT LOW MDM: CPT

## 2020-05-16 PROCEDURE — 87086 URINE CULTURE/COLONY COUNT: CPT

## 2020-05-16 PROCEDURE — 74011250636 HC RX REV CODE- 250/636: Performed by: EMERGENCY MEDICINE

## 2020-05-16 PROCEDURE — 81025 URINE PREGNANCY TEST: CPT

## 2020-05-16 PROCEDURE — 74011000250 HC RX REV CODE- 250: Performed by: EMERGENCY MEDICINE

## 2020-05-16 PROCEDURE — 86780 TREPONEMA PALLIDUM: CPT

## 2020-05-16 PROCEDURE — 96372 THER/PROPH/DIAG INJ SC/IM: CPT

## 2020-05-16 RX ORDER — METRONIDAZOLE 250 MG/1
2000 TABLET ORAL
Status: COMPLETED | OUTPATIENT
Start: 2020-05-16 | End: 2020-05-16

## 2020-05-16 RX ORDER — QUETIAPINE FUMARATE 25 MG/1
25 TABLET, FILM COATED ORAL
COMMUNITY
End: 2020-08-04

## 2020-05-16 RX ORDER — DOXYCYCLINE 100 MG/1
100 CAPSULE ORAL 2 TIMES DAILY
Qty: 14 CAP | Refills: 0 | Status: SHIPPED | OUTPATIENT
Start: 2020-05-16 | End: 2020-05-23

## 2020-05-16 RX ORDER — AZITHROMYCIN 250 MG/1
1000 TABLET, FILM COATED ORAL
Status: DISCONTINUED | OUTPATIENT
Start: 2020-05-16 | End: 2020-05-16

## 2020-05-16 RX ORDER — DOXYCYCLINE 100 MG/1
100 CAPSULE ORAL
Status: COMPLETED | OUTPATIENT
Start: 2020-05-16 | End: 2020-05-16

## 2020-05-16 RX ADMIN — DOXYCYCLINE 100 MG: 100 CAPSULE ORAL at 09:18

## 2020-05-16 RX ADMIN — METRONIDAZOLE 2000 MG: 250 TABLET ORAL at 09:18

## 2020-05-16 RX ADMIN — PENICILLIN G BENZATHINE 2.4 MILLION UNITS: 1200000 INJECTION, SUSPENSION INTRAMUSCULAR at 09:24

## 2020-05-16 RX ADMIN — LIDOCAINE HYDROCHLORIDE 250 MG: 10 INJECTION, SOLUTION EPIDURAL; INFILTRATION; INTRACAUDAL; PERINEURAL at 09:20

## 2020-05-16 NOTE — ED PROVIDER NOTES
HPI     26-year-old female who recently moved here from [place] 2 days ago was contacted by a former sexual partner saying that he was positive for syphilis. She was with him approximately 6 months ago and since that time has not really noticed any symptoms. She states she is currently having a clear, slightly odorous vaginal discharge and intermittent mild lower abdominal cramping sometimes, which is not currently present now. Past Medical History:   Diagnosis Date    ADHD (attention deficit hyperactivity disorder)     Anemia     Bipolar depression ([facility] [address]     Depression     Miscarriage 09/2016       Past Surgical History:   Procedure Laterality Date    ABDOMEN SURGERY PROC UNLISTED      HX GI      HX OTHER SURGICAL      rectal surgery for abscess    HX OTHER SURGICAL      diverticulum near ovary removed, transvaginal         Family History:   Problem Relation Age of Onset    Hypertension Mother     Diabetes Mother     Diabetes Father     Hypertension Father     Stroke Maternal Grandmother        Social History     Socioeconomic History    Marital status: SINGLE     Spouse name: Not on file    Number of children: Not on file    Years of education: Not on file    Highest education level: Not on file   Occupational History    Not on file   Social Needs    Financial resource strain: Not on file    Food insecurity     Worry: Not on file     Inability: Not on file    Transportation needs     Medical: Not on file     Non-medical: Not on file   Tobacco Use    Smoking status: Current Every Day Smoker     Packs/day: 0.25     Years: 10.00     Pack years: 2.50     Last attempt to quit: 3/17/2017     Years since quitting: 3.1    Smokeless tobacco: Never Used    Tobacco comment: 2 cigarrettes/day.  pt wants to quit   Substance and Sexual Activity    Alcohol use: Yes     Comment: ocasionally    Drug use: No    Sexual activity: Yes     Partners: Male     Birth control/protection: None   Lifestyle    Physical activity     Days per week: Not on file     Minutes per session: Not on file    Stress: Not on file   Relationships    Social connections     Talks on phone: Not on file     Gets together: Not on file     Attends [employer] service: Not on file     Active member of club or organization: Not on file     Attends meetings of clubs or organizations: Not on file     Relationship status: Not on file    Intimate partner violence     Fear of current or ex partner: Not on file     Emotionally abused: Not on file     Physically abused: Not on file     Forced sexual activity: Not on file   Other Topics Concern    Not on file   Social History Narrative    Not on file         ALLERGIES: Peanut    Review of Systems   Constitutional: Negative for chills and fever. HENT: Negative for ear pain and sore throat. Eyes: Negative for pain and visual disturbance. Respiratory: Negative for cough and shortness of breath. Cardiovascular: Negative for chest pain and palpitations. Gastrointestinal: Positive for abdominal pain (sporadic, not present now). Negative for diarrhea, nausea and vomiting. Genitourinary: Positive for vaginal discharge. Negative for flank pain. Musculoskeletal: Negative for back pain and neck pain. Neurological: Negative for syncope and headaches. Psychiatric/Behavioral: Negative for agitation. The patient is not nervous/anxious. Vitals:    05/16/20 0729   BP: 131/89   Pulse: (!) 54   Resp: 16   Temp: 98.3 °F (36.8 °C)   SpO2: 100%   Weight: 81.6 kg (180 lb)   Height: 5' 5\" (1.651 m)            Physical Exam  Vitals signs and nursing note reviewed. Constitutional:       General: She is not in acute distress. Appearance: Normal appearance. She is well-developed. She is not ill-appearing, toxic-appearing or diaphoretic. HENT:      Head: Normocephalic and atraumatic. Mouth/Throat:      Mouth: Mucous membranes are moist.   Eyes:      General: No scleral icterus. Extraocular Movements: Extraocular movements intact. Conjunctiva/sclera: Conjunctivae normal.      Pupils: Pupils are equal, round, and reactive to light. Neck:      Musculoskeletal: Normal range of motion and neck supple. Trachea: No tracheal deviation. Cardiovascular:      Rate and Rhythm: Normal rate and regular rhythm. Pulses: Normal pulses. Heart sounds: No murmur. Pulmonary:      Effort: Pulmonary effort is normal. No respiratory distress. Breath sounds: Normal breath sounds. Abdominal:      General: There is no distension. Palpations: Abdomen is soft. Tenderness: There is no abdominal tenderness. There is no guarding or rebound. Musculoskeletal: Normal range of motion. General: No deformity. Right lower leg: No edema. Left lower leg: No edema. Skin:     General: Skin is warm and dry. Capillary Refill: Capillary refill takes less than 2 seconds. Findings: No rash. Neurological:      General: No focal deficit present. Mental Status: She is alert and oriented to person, place, and time. Mental status is at baseline. Cranial Nerves: No cranial nerve deficit. Sensory: No sensory deficit. Motor: No weakness.       Comments: No gross neuro deficit   Psychiatric:         Mood and Affect: Mood normal.        Labs Reviewed   URINALYSIS W/ RFLX MICROSCOPIC - Abnormal; Notable for the following components:       Result Value    Leukocyte Esterase MODERATE (*)     All other components within normal limits   URINE MICROSCOPIC ONLY - Abnormal; Notable for the following components:    Bacteria 3+ (*)     Trichomonas FEW (*)     All other components within normal limits   WET PREP   CULTURE, URINE   CHLAMYDIA/NEISSERIA AMPLIFICATION   HCG URINE, QL   T PALLIDUM AB, BY FTA-ABS   HIV-1,2 P24 AG/AB SCREEN     Medications   penicillin g benzathine (BICILLIN LA) IM injection 2.4 Million Units (has no administration in time range) cefTRIAXone (ROCEPHIN) 250 mg in lidocaine (PF) (XYLOCAINE) 10 mg/mL (1 %) IM injection (has no administration in time range)   metroNIDAZOLE (FLAGYL) tablet 2,000 mg (has no administration in time range)   doxycycline (VIBRAMYCIN) capsule 100 mg (has no administration in time range)       MDM  ED Course as of May 16 0900   Sat May 16, 2020   0 78-year-old female who recently moved here from [place] 2 days ago was contacted by a former sexual partner saying that he was positive for syphilis. She was with him approximately 6 months ago and since that time has not really noticed any symptoms. She states she is currently having a clear, slightly odorous vaginal discharge and intermittent mild lower abdominal cramping sometimes, which is not currently present now. Patient is well-appearing, vitals are notable for mild bradycardia which is likely baseline. She has a completely benign abdominal exam and no complaints at this time aside from review of systems which is positive for a clear slightly odorous vaginal discharge. After discussion with the patient she would like to be empirically treated for chlamydia, gonorrhea and syphilis which I feel is appropriate. Patient will self swab. [KG]   [id number] Wet prep notable for trichomonas and UTI versus contamination, urine culture will be sent. [KG]      ED Course User Index  [KG] Bianca Blanco, DO     Due to trichomonas and history of syphilis exposure patient is high risk for PID and will be treated empirically for chlamydia and gonorrhea with ceftriaxone IM and course of doxycycline. Patient was treated for syphilis with IM penicillin and trichomonas with 2 g of Flagyl. Urine culture sent, likely contaminated will hold antibiotic course at this time due to UTI due to number of medications required at this time and risk for C. difficile. HIV testing pending. Patient was informed to notify any and all partners.     Patient has no new complaints, changes, or physical findings. Diagnostic studies if preformed were reviewed with the patient and/or family. All questions and concerns were addressed. Care plan was outlined, including follow-up with PCP/specialist and return precautions were discussed. Patient is felt to be stable for discharge at this time. Procedures    ICD-10-CM ICD-9-CM    1. Exposure to syphilis Z20.2 V01.6    2.  Vaginal discharge N89.8 623.5      Home

## 2020-05-16 NOTE — ED NOTES
Pt states that her \"partner informed her that he had syphilis\". Pt is now concerned and wants to be checked.

## 2020-05-16 NOTE — DISCHARGE INSTRUCTIONS
Patient Education        Exposure to Sexually Transmitted Infections: Care Instructions  Your Care Instructions  Sexually transmitted infections (STIs) are those diseases spread by sexual contact. There are at least 20 different STIs, including chlamydia, gonorrhea, syphilis, and human immunodeficiency virus (HIV), which causes AIDS. Bacteria-caused STIs can be treated and cured. STIs caused by viruses, such as HIV, can be treated but not cured. Some STIs can reduce a woman's chances of getting pregnant in the future. STIs are spread during sexual contact, such as vaginal intercourse and oral or anal sex. Follow-up care is a key part of your treatment and safety. Be sure to make and go to all appointments, and call your doctor if you are having problems. It's also a good idea to know your test results and keep a list of the medicines you take. How can you care for yourself at home? · Your doctor may have given you a shot of antibiotics. If your doctor prescribed antibiotic pills, take them as directed. Do not stop taking them just because you feel better. You need to take the full course of antibiotics. · Do not have sexual contact while you have symptoms of an STI or are being treated for an STI. · Tell your sex partner (or partners) that he or she will need treatment. · If you are a woman, do not douche. Douching changes the normal balance of bacteria in the vagina and may spread an infection up into your reproductive organs. To prevent exposure to STIs in the future  · Use latex condoms every time you have sex. Use them from the beginning to the end of sexual contact. · Talk to your partner before you have sex. Find out if he or she has or is at risk for any STI. Keep in mind that a person may be able to spread an STI even if he or she does not have symptoms. · Do not have sex if you are being treated for an STI.   · Do not have sex with anyone who has symptoms of an STI, such as sores on the genitals or mouth.  · Having one sex partner (who does not have STIs and does not have sex with anyone else) is a good way to avoid STIs. When should you call for help? Call your doctor now or seek immediate medical care if:    · You have new pain in your belly or pelvis.     · You have symptoms of a urinary tract infection. These may include:  ? Pain or burning when you urinate. ? A frequent need to urinate without being able to pass much urine. ? Pain in the flank, which is just below the rib cage and above the waist on either side of the back. ? Blood in your urine. ? A fever.     · You have new or worsening pain or swelling in the scrotum.    Watch closely for changes in your health, and be sure to contact your doctor if:    · You have unusual vaginal bleeding.     · You have a discharge from the vagina or penis.     · You have any new symptoms, such as sores, bumps, rashes, blisters, or warts.     · You have itching, tingling, pain, or burning in the genital or anal area.     · You think you may have an STI. Where can you learn more? Go to http://kaya-bernard.info/  Enter M049 in the search box to learn more about \"Exposure to Sexually Transmitted Infections: Care Instructions. \"  Current as of: July 7, 2019Content Version: 12.4  © 5287-7393 [employer], [employer]. Care instructions adapted under license by [employer] (which disclaims liability or warranty for this information). If you have questions about a medical condition or this instruction, always ask your healthcare professional. [place] [address] any warranty or liability for your use of this information.

## 2020-05-16 NOTE — ED TRIAGE NOTES
Patient states she was told she was exposed to syphilis, has some discharge and itching, cramping in bottom of stomach

## 2020-05-16 NOTE — ED NOTES
I have reviewed discharge instruction and prescriptions with pt. Pt verbalized understanding and has no further questions at this time. Education taught and patient verbalized understanding of education. Teach back method used. Armband removed and shredded per patients request.    Patients pain 0  Belongings . are with pt  Patient discharged with self to home.

## 2020-05-18 LAB
BACTERIA SPEC CULT: NORMAL
CC UR VC: NORMAL
HIV 1+2 AB+HIV1 P24 AG SERPL QL IA: NONREACTIVE
HIV12 RESULT COMMENT, HHIVC: NORMAL
SERVICE CMNT-IMP: NORMAL
T PALLIDUM AB SER QL IF: NON REACTIVE

## 2020-07-31 ENCOUNTER — APPOINTMENT (OUTPATIENT)
Dept: GENERAL RADIOLOGY | Age: 35
End: 2020-07-31
Attending: PHYSICIAN ASSISTANT
Payer: COMMERCIAL

## 2020-07-31 ENCOUNTER — HOSPITAL ENCOUNTER (EMERGENCY)
Age: 35
Discharge: PSYCHIATRIC OR SUBSTANCE USE FACILITY | End: 2020-08-02
Attending: EMERGENCY MEDICINE
Payer: COMMERCIAL

## 2020-07-31 DIAGNOSIS — R45.851 SUICIDAL THOUGHTS: Primary | ICD-10-CM

## 2020-07-31 LAB
ALBUMIN SERPL-MCNC: 4.3 G/DL (ref 3.4–5)
ALBUMIN/GLOB SERPL: 1 {RATIO} (ref 0.8–1.7)
ALP SERPL-CCNC: 48 U/L (ref 45–117)
ALT SERPL-CCNC: 39 U/L (ref 13–56)
AMPHET UR QL SCN: NEGATIVE
ANION GAP SERPL CALC-SCNC: 8 MMOL/L (ref 3–18)
AST SERPL-CCNC: 25 U/L (ref 10–38)
BARBITURATES UR QL SCN: NEGATIVE
BASOPHILS # BLD: 0 K/UL (ref 0–0.1)
BASOPHILS NFR BLD: 1 % (ref 0–2)
BENZODIAZ UR QL: NEGATIVE
BILIRUB SERPL-MCNC: 0.4 MG/DL (ref 0.2–1)
BUN SERPL-MCNC: 14 MG/DL (ref 7–18)
BUN/CREAT SERPL: 17 (ref 12–20)
CALCIUM SERPL-MCNC: 8.9 MG/DL (ref 8.5–10.1)
CANNABINOIDS UR QL SCN: POSITIVE
CHLORIDE SERPL-SCNC: 111 MMOL/L (ref 100–111)
CK MB CFR SERPL CALC: 0.5 % (ref 0–4)
CK MB SERPL-MCNC: 1.4 NG/ML (ref 5–25)
CK SERPL-CCNC: 288 U/L (ref 26–192)
CO2 SERPL-SCNC: 23 MMOL/L (ref 21–32)
COCAINE UR QL SCN: POSITIVE
CREAT SERPL-MCNC: 0.81 MG/DL (ref 0.6–1.3)
DIFFERENTIAL METHOD BLD: ABNORMAL
EOSINOPHIL # BLD: 0.1 K/UL (ref 0–0.4)
EOSINOPHIL NFR BLD: 1 % (ref 0–5)
ERYTHROCYTE [DISTWIDTH] IN BLOOD BY AUTOMATED COUNT: 14.1 % (ref 11.6–14.5)
ETHANOL SERPL-MCNC: 105 MG/DL (ref 0–3)
GLOBULIN SER CALC-MCNC: 4.1 G/DL (ref 2–4)
GLUCOSE SERPL-MCNC: 89 MG/DL (ref 74–99)
HCG UR QL: NEGATIVE
HCT VFR BLD AUTO: 34.2 % (ref 35–45)
HDSCOM,HDSCOM: ABNORMAL
HGB BLD-MCNC: 11.2 G/DL (ref 12–16)
LYMPHOCYTES # BLD: 3.5 K/UL (ref 0.9–3.6)
LYMPHOCYTES NFR BLD: 41 % (ref 21–52)
MCH RBC QN AUTO: 24.6 PG (ref 24–34)
MCHC RBC AUTO-ENTMCNC: 32.7 G/DL (ref 31–37)
MCV RBC AUTO: 75 FL (ref 74–97)
METHADONE UR QL: NEGATIVE
MONOCYTES # BLD: 0.5 K/UL (ref 0.05–1.2)
MONOCYTES NFR BLD: 6 % (ref 3–10)
NEUTS SEG # BLD: 4.5 K/UL (ref 1.8–8)
NEUTS SEG NFR BLD: 51 % (ref 40–73)
OPIATES UR QL: NEGATIVE
PCP UR QL: NEGATIVE
PLATELET # BLD AUTO: 256 K/UL (ref 135–420)
PMV BLD AUTO: 12.1 FL (ref 9.2–11.8)
POTASSIUM SERPL-SCNC: 3.4 MMOL/L (ref 3.5–5.5)
PROT SERPL-MCNC: 8.4 G/DL (ref 6.4–8.2)
RBC # BLD AUTO: 4.56 M/UL (ref 4.2–5.3)
SODIUM SERPL-SCNC: 142 MMOL/L (ref 136–145)
TROPONIN I SERPL-MCNC: <0.02 NG/ML (ref 0–0.04)
WBC # BLD AUTO: 8.6 K/UL (ref 4.6–13.2)

## 2020-07-31 PROCEDURE — 93005 ELECTROCARDIOGRAM TRACING: CPT

## 2020-07-31 PROCEDURE — 81025 URINE PREGNANCY TEST: CPT

## 2020-07-31 PROCEDURE — 80053 COMPREHEN METABOLIC PANEL: CPT

## 2020-07-31 PROCEDURE — 82550 ASSAY OF CK (CPK): CPT

## 2020-07-31 PROCEDURE — 99285 EMERGENCY DEPT VISIT HI MDM: CPT

## 2020-07-31 PROCEDURE — 85025 COMPLETE CBC W/AUTO DIFF WBC: CPT

## 2020-07-31 PROCEDURE — 80307 DRUG TEST PRSMV CHEM ANLYZR: CPT

## 2020-07-31 PROCEDURE — 87635 SARS-COV-2 COVID-19 AMP PRB: CPT

## 2020-07-31 PROCEDURE — 74011250637 HC RX REV CODE- 250/637: Performed by: PHYSICIAN ASSISTANT

## 2020-07-31 PROCEDURE — 71045 X-RAY EXAM CHEST 1 VIEW: CPT

## 2020-07-31 RX ORDER — QUETIAPINE FUMARATE 25 MG/1
25 TABLET, FILM COATED ORAL
Status: DISCONTINUED | OUTPATIENT
Start: 2020-07-31 | End: 2020-07-31

## 2020-07-31 RX ORDER — QUETIAPINE FUMARATE 25 MG/1
25 TABLET, FILM COATED ORAL
Status: COMPLETED | OUTPATIENT
Start: 2020-07-31 | End: 2020-07-31

## 2020-07-31 RX ADMIN — QUETIAPINE FUMARATE 25 MG: 25 TABLET ORAL at 21:04

## 2020-07-31 NOTE — ED TRIAGE NOTES
Patient arrives via EMS w c/o Chest pain, anxiety, and mild depression. Staes she has a history of anxiety and depression, and is unsure if she had a panic attack today.

## 2020-07-31 NOTE — CONSULTS
Name: Marlon Newberry    : 1985   Date: 2020    Time: 1800   Location of patient: [facility]  Location of doctor: [place]   This evaluation was performed via telepsych machine with the help of onsite staff. Chief Complaint: alcohol, anxiety, depression, suicidal ideation  History of Present Illness: Patient is a 38year old, single, -American lady with history of bipolar mood disorder, alcohol use binge drinking disorder presenting to the emergency department endorsing anxiety, depression, suicidal ideation. Patient requesting inpatient dual diagnosis treatment and cannot contract for safety. Tele-psychiatry was consultative. Patient was pleasant and cooperative with interview, forthcoming with information. Patient states that she has a severe problem with binge drinking alcohol, her last binge started the day before yesterday and she drank until she was completely knocked out, waking up today, she had a long conversation with her boyfriend and decided to get much needed help. Patient shared that she has been struggling with worsening depression and anxiety secondary to recent life stressors. Multiple life stressors include having three daughters ages 15, [de-identified], [de-identified] currently in the custody of her mother, inability to work secondary to bilateral ankle issues and left knee issues, currently applying for disability, relational stressor between she and her boyfriend secondary to her alcohol use, and inability to control her alcohol use and binge drinking. She states that it is very difficult for her to tell exactly how much she drinks because \"I just keep on drinking until I pass out completely. I blackout so I don't remember what I said what I did. I need to stop. \"  SI/ Self harm: Patient does have history of suicidal ideation as means of coping, she has three suicide gestures in the past including cutting wrist, never had any attempts so significant that she needed ICU admission. HI/Violence: Denied  Access to weapons: Denied  Legal: none. Psychiatric History/Treatment History: patient was diagnosed with bipolar mood disorder and 2008, currently taking Depakote and Seroquel. She reports her medications working well \"when I don't drink. \" Last inpatient psychiatric hospitalization was at [facility] between July 1 and seventh of 2020. She is connected with outpatient mental health. Drug/Alcohol History: patient's drug of choice is alcohol which she binge drinks for days, she does smoke marijuana and uses cocaine occasionally. She has failed one attempt at inpatient substance use treatment leaving after one day. Her longest period of sobriety was two months. Patient states that her alcohol use has become increasingly uncontrollable. Blood alcohol level was 105 on admission today at 1339. Urine drug screen was positive for cocaine and THC. Medical History: ankle issues, left knee issues. Medications & Freq: she reports that she has been compliant with Depakote and Seroquel  Allergies: peanut, no drug allergies  Family Psych History/History of suicide: family history of alcohol use  Social History: patient living with her boyfriend and three daughters ages 15, [de-identified], and [de-identified] from a previous relationship, the children are currently with her mother. She and her boyfriend have been together \"on and off\" for the past six years.    Employment: unemployed, used to work as a cook in [employer] the has not been able to secondary to medical issues    Stressors: financial, unemployment, physical issues, uncontrollable alcohol use, relational  Mental Status Exam:   Appearance and attire: adequately groomed, dressed in hospital gown  Attitude and behavior: pleasant and cooperative, forth coming with information  Speech: normal volume, rate, and tone, none pressured  Affect and mood:   Association and thought processes: Linear, logical, and goal directed without tangentiality, thought blocking, racing thoughts, flight of ideas, looseness of associations. Thought content: endorses suicidal ideation but more as a means of coping, denies homicidal thoughts, paranoid ideations or delusions  Perception: Denied auditory or visual hallucinations   Sensorium, memory, and orientation: no sensory deficits, memory intact, alert and oriented to person, place, time, and situation. Intellectual functioning: average   Insight and judgment: limited  Impression/Risk Assessment: 64-year-old -American lady with history of bipolar mood disorder, alcohol use disorder binge type presents to the emergency department endorsing worsening depression, anxiety, suicidal ideation after recent binge of alcohol and waking up earlier today. Patient feels overwhelmed by her inability to control her alcohol use. She is requesting inpatient psychiatric hospitalization with dual diagnosis if available for stabilization of mental health and also her uncontrollable alcohol use, marijuana use, and occasional cocaine use. Diagnosis: unspecified mood disorder. Alcohol use disorder. Cocaine use disorder. Treatment Recommendations: Patient meets criteria for inpatient psychiatric hospitalization with dual diagnosis if available. Patient is very motivated to participate in inpatient substance abuse treatment following psychiatric stabilization. Pharmacological: continue home medications.   Therapy: will benefit from supportive psychotherapy, addiction therapy  Level of Care: Inpatient psychiatric hospitalization

## 2020-07-31 NOTE — ED NOTES
Pt resting quietly. Voice no c/o pain or discomfort. Skin normal color warm and dry Resp regular unlabored. Breath sounds clear all fields. Abd soft non tender. Acosta well and equal OOB to BR with a steady upright gait. Voiding clear yellow urine Sample sent to lab.

## 2020-07-31 NOTE — ED NOTES
1500 Florentino Loyola is requesting a negative Co-vid screening prior to evaluating pt for admission

## 2020-08-01 ENCOUNTER — APPOINTMENT (OUTPATIENT)
Dept: GENERAL RADIOLOGY | Age: 35
End: 2020-08-01
Attending: EMERGENCY MEDICINE
Payer: COMMERCIAL

## 2020-08-01 LAB
SARS-COV-2, COV2: NOT DETECTED
SPECIMEN SOURCE, FCOV2M: NORMAL

## 2020-08-01 PROCEDURE — 73564 X-RAY EXAM KNEE 4 OR MORE: CPT

## 2020-08-01 PROCEDURE — 74011250637 HC RX REV CODE- 250/637: Performed by: PSYCHIATRY & NEUROLOGY

## 2020-08-01 RX ORDER — DIVALPROEX SODIUM 250 MG/1
500 TABLET, DELAYED RELEASE ORAL 2 TIMES DAILY
Status: DISCONTINUED | OUTPATIENT
Start: 2020-08-01 | End: 2020-08-02 | Stop reason: HOSPADM

## 2020-08-01 RX ORDER — DIVALPROEX SODIUM 250 MG/1
250 TABLET, DELAYED RELEASE ORAL EVERY 12 HOURS
Status: DISCONTINUED | OUTPATIENT
Start: 2020-08-01 | End: 2020-08-01

## 2020-08-01 RX ORDER — QUETIAPINE FUMARATE 25 MG/1
50 TABLET, FILM COATED ORAL
Status: DISCONTINUED | OUTPATIENT
Start: 2020-08-01 | End: 2020-08-02 | Stop reason: HOSPADM

## 2020-08-01 RX ORDER — IBUPROFEN 200 MG
1 TABLET ORAL DAILY
Status: DISCONTINUED | OUTPATIENT
Start: 2020-08-02 | End: 2020-08-02 | Stop reason: HOSPADM

## 2020-08-01 RX ADMIN — DIVALPROEX SODIUM 500 MG: 250 TABLET, DELAYED RELEASE ORAL at 21:15

## 2020-08-01 RX ADMIN — QUETIAPINE FUMARATE 50 MG: 25 TABLET ORAL at 21:15

## 2020-08-01 NOTE — PROGRESS NOTES
7291-- In ED. Spoke with [address] [place] [address]. [facility] had accepted pending covid test. Results remain pending at this time.     0840--Called [facility] Psych [phone] as pt had been accepted pending COVID test. Spoke to Scottie Vela. COVID result remains pending at this time. 1120--Spoke with Etsefani Omer charge nurse. She has spoken to [facility] in the Lab who is checking on the status of the COVID results. Estefani Omer is aware that pt has accepting psych facility once COVID results come back. Must be negative to transfer. 1125-- Met with patient. Advised that [facility] has accepted, just waiting on COVID results. Pt is in agreement with transfer to [facility].    ~12noon-- Advised by Estefani Omer, charge nurse that COVID swabs were taken to [facility] today and will take 48 hours for results. Emailed Mark Farrell, Director of  to advise. 1445--Spoke with Scottie Vela at [facility] and advised that COVID is pending at [facility] ER and were told results would take 48 hours.     Jim Hansen RN    Outcomes Manager  [phone]  [phone]

## 2020-08-01 NOTE — ED PROVIDER NOTES
EMERGENCY DEPARTMENT HISTORY AND PHYSICAL EXAM    Date: 7/31/2020  Patient Name: Gurvinder Tyson    History of Presenting Illness     Chief Complaint   Patient presents with    Chest Pain    Anxiety         History Provided By: patient        Additional History (Context): Gurvinder Tyson is a 76-year-old female with noted past medical history as listed below including mental health disorders presenting to the emergency department with complaint of suicidal thoughts, depression, chest pain and feelings of anxiety. Patient states she has a history of anxiety, sometimes has central chest pain when she has flareups. Patient states she she had intermittent chest pain while walking earlier today but has since resolved. No shortness of breath, no cough, no fever, chills or any other complaints. States she feels like she wants to harm herself, has a history of depression and is not currently being treated appropriately. States she is not homicidal, no delusions or any other complaints. No hearing or seeing anything. PCP: None    Current Outpatient Medications   Medication Sig Dispense Refill    QUEtiapine (SEROqueL) 25 mg tablet Take 25 mg by mouth.       OTHER          Past History     Past Medical History:  Past Medical History:   Diagnosis Date    ADHD (attention deficit hyperactivity disorder)     Anemia     Bipolar depression ([facility] [address]     Depression     Miscarriage 09/2016       Past Surgical History:  Past Surgical History:   Procedure Laterality Date    ABDOMEN SURGERY PROC UNLISTED      HX GI      HX OTHER SURGICAL      rectal surgery for abscess    HX OTHER SURGICAL      diverticulum near ovary removed, transvaginal       Family History:  Family History   Problem Relation Age of Onset    Hypertension Mother     Diabetes Mother     Diabetes Father     Hypertension Father     Stroke Maternal Grandmother        Social History:  Social History     Tobacco Use    Smoking status: Current Every Day Smoker     Packs/day: 0.25     Years: 10.00     Pack years: 2.50     Last attempt to quit: 3/17/2017     Years since quitting: 3.3    Smokeless tobacco: Never Used    Tobacco comment: 2 cigarrettes/day. pt wants to quit   Substance Use Topics    Alcohol use: Yes     Comment: ocasionally    Drug use: No       Allergies: Allergies   Allergen Reactions    Peanut Hives         Review of Systems       Review of Systems   Constitutional: Negative for chills and fever. HENT: Negative for nasal congestion, sore throat, rhinorrhea  Eyes: Negative. Respiratory: Negative for cough and negative for shortness of breath. Cardiovascular: Negative for chest pain and palpitations. Gastrointestinal: Negative for abdominal pain, constipation, diarrhea, nausea and vomiting. Genitourinary: Negative. Negative for difficulty urinating and flank pain. Musculoskeletal: Negative for back pain. Negative for gait problem and neck pain. Skin: Negative. Allergic/Immunologic: Negative. Neurological: Negative for dizziness, weakness, numbness and headaches. Psychiatric/Behavioral: pos for SI. All other systems reviewed and are negative. All Other Systems Negative  Physical Exam     Vitals:    07/31/20 1331 07/31/20 1518 07/31/20 2018   BP: 142/75 141/84 129/74   Pulse: 89 78 65   Resp: 18 19 20   Temp: 98.3 °F (36.8 °C) 97.6 °F (36.4 °C) 98 °F (36.7 °C)   SpO2: 98% 99% 97%   Weight: 88.5 kg (195 lb)     Height: 5' 5\" (1.651 m)       Physical Exam  Vitals signs and nursing note reviewed. Constitutional:       General: She is not in acute distress. Appearance: She is well-developed. She is not diaphoretic. HENT:      Head: Normocephalic and atraumatic. Nose: Nose normal.   Eyes:      Conjunctiva/sclera: Conjunctivae normal.      Pupils: Pupils are equal, round, and reactive to light. Neck:      Musculoskeletal: Normal range of motion and neck supple.    Cardiovascular:      Rate and Rhythm: Normal rate and regular rhythm. Pulmonary:      Effort: Pulmonary effort is normal. No respiratory distress. Breath sounds: Normal breath sounds. Abdominal:      Palpations: Abdomen is soft. Musculoskeletal: Normal range of motion. Skin:     General: Skin is warm. Findings: No rash. Neurological:      Mental Status: She is alert and oriented to person, place, and time. Cranial Nerves: No cranial nerve deficit. Coordination: Coordination normal.      Comments: +SI   Psychiatric:         Behavior: Behavior normal.           Diagnostic Study Results     Labs -     Recent Results (from the past 12 hour(s))   CBC WITH AUTOMATED DIFF    Collection Time: 07/31/20  1:39 PM   Result Value Ref Range    WBC 8.6 4.6 - 13.2 K/uL    RBC 4.56 4.20 - 5.30 M/uL    HGB 11.2 (L) 12.0 - 16.0 g/dL    HCT 34.2 (L) 35.0 - 45.0 %    MCV 75.0 74.0 - 97.0 FL    MCH 24.6 24.0 - 34.0 PG    MCHC 32.7 31.0 - 37.0 g/dL    RDW 14.1 11.6 - 14.5 %    PLATELET 660 630 - 079 K/uL    MPV 12.1 (H) 9.2 - 11.8 FL    NEUTROPHILS 51 40 - 73 %    LYMPHOCYTES 41 21 - 52 %    MONOCYTES 6 3 - 10 %    EOSINOPHILS 1 0 - 5 %    BASOPHILS 1 0 - 2 %    ABS. NEUTROPHILS 4.5 1.8 - 8.0 K/UL    ABS. LYMPHOCYTES 3.5 0.9 - 3.6 K/UL    ABS. MONOCYTES 0.5 0.05 - 1.2 K/UL    ABS. EOSINOPHILS 0.1 0.0 - 0.4 K/UL    ABS. BASOPHILS 0.0 0.0 - 0.1 K/UL    DF AUTOMATED     METABOLIC PANEL, COMPREHENSIVE    Collection Time: 07/31/20  1:39 PM   Result Value Ref Range    Sodium 142 136 - 145 mmol/L    Potassium 3.4 (L) 3.5 - 5.5 mmol/L    Chloride 111 100 - 111 mmol/L    CO2 23 21 - 32 mmol/L    Anion gap 8 3.0 - 18 mmol/L    Glucose 89 74 - 99 mg/dL    BUN 14 7.0 - 18 MG/DL    Creatinine 0.81 0.6 - 1.3 MG/DL    BUN/Creatinine ratio 17 12 - 20      GFR est AA >60 >60 ml/min/1.73m2    GFR est non-AA >60 >60 ml/min/1.73m2    Calcium 8.9 8.5 - 10.1 MG/DL    Bilirubin, total 0.4 0.2 - 1.0 MG/DL    ALT (SGPT) 39 13 - 56 U/L    AST (SGOT) 25 10 - 38 U/L    Alk. phosphatase 48 45 - 117 U/L    Protein, total 8.4 (H) 6.4 - 8.2 g/dL    Albumin 4.3 3.4 - 5.0 g/dL    Globulin 4.1 (H) 2.0 - 4.0 g/dL    A-G Ratio 1.0 0.8 - 1.7     CARDIAC PANEL,(CK, CKMB & TROPONIN)    Collection Time: 07/31/20  1:39 PM   Result Value Ref Range    CK - MB 1.4 <3.6 ng/ml    CK-MB Index 0.5 0.0 - 4.0 %     (H) 26 - 192 U/L    Troponin-I, QT <0.02 0.0 - 0.045 NG/ML   ETHYL ALCOHOL    Collection Time: 07/31/20  1:39 PM   Result Value Ref Range    ALCOHOL(ETHYL),SERUM 105 (H) 0 - 3 MG/DL   DRUG SCREEN, URINE    Collection Time: 07/31/20  3:34 PM   Result Value Ref Range    BENZODIAZEPINES Negative NEG      BARBITURATES Negative NEG      THC (TH-CANNABINOL) Positive (A) NEG      OPIATES Negative NEG      PCP(PHENCYCLIDINE) Negative NEG      COCAINE Positive (A) NEG      AMPHETAMINES Negative NEG      METHADONE Negative NEG      HDSCOM (NOTE)        Radiologic Studies -   XR CHEST PORT   Final Result   IMPRESSION:      No acute radiographic cardiopulmonary abnormality. CT Results  (Last 48 hours)    None        CXR Results  (Last 48 hours)               07/31/20 1430  XR CHEST PORT Final result    Impression:  IMPRESSION:       No acute radiographic cardiopulmonary abnormality. Narrative:  EXAM: XR CHEST PORT       CLINICAL INDICATION/HISTORY: chest pain, sob, and/or arrhythmia   -Additional: None       COMPARISON: 1/30/2018       TECHNIQUE: Frontal view of the chest       _______________       FINDINGS:       HEART AND MEDIASTINUM: Normal cardiac size and mediastinal contours. LUNGS AND PLEURAL SPACES: No focal pneumonic consolidation, pneumothorax, or   pleural effusion. BONY THORAX AND SOFT TISSUES: No acute osseous abnormality       _______________                   Medical Decision Making   I am the first provider for this patient.     I reviewed the vital signs, available nursing notes, past medical history, past surgical history, family history and social history. Vital Signs-Reviewed the patient's vital signs. Records Reviewed: Nursing notes, old medical records and any previous labs, imaging, visits, consultations pertinent to patient care    Procedures:  Procedures      ED Course: Progress Notes, Reevaluation, and Consults:      Provider Notes (Medical Decision Making):   Patient here with episode of chest pain as well as depression and suicidal thoughts. Patient is medically cleared with normal troponin, EKG and chest x-ray. Patient has no chest pain at present and states she feels like this is related to her anxiety. No chest pain throughout ED visit. Patient seen by telemetry psychiatrist who recommends admission. Patient is voluntary for admission, will monitor until placement is made. COVID test pending. 9:35 PM : Pt care transferred to Dr. Michael Lynch. ED provider. History of patient complaint(s), available diagnostic reports and current treatment plan has been discussed thoroughly. Bedside rounding on patient occured : no . Intended disposition of patient : Home, ADMIT, Transfer, Extended Care Facility, TBD, AMA and ELOPED  Pending diagnostics reports and/or labs (please list):     Dr. Dee Arreola assistance in completion of this plan is greatly appreciated but it should be noted that I will be the provider of record for this patient. Ricardo Head PA-C     MED RECONCILIATION:  No current facility-administered medications for this encounter. Current Outpatient Medications   Medication Sig    QUEtiapine (SEROqueL) 25 mg tablet Take 25 mg by mouth.  OTHER        Disposition:  home    DISCHARGE NOTE:     Pt has been reexamined. Patient has no new complaints, changes, or physical findings. Care plan outlined and precautions discussed. Discussed proper way to take medications. Discussed treatment plan, return precautions, symptomatic relief, and expected time to improvement. All questions answered.  Patient is stable for discharge and outpatient management. Patient is ready to go home. Follow-up Information    None         Current Discharge Medication List                Diagnosis     Clinical Impression:   1. Suicidal thoughts            Dictation disclaimer:  Please note that this dictation was completed with [employer], the computer voice recognition software. Quite often unanticipated grammatical, syntax, homophones, and other interpretive errors are inadvertently transcribed by the computer software. Please disregard these errors. Please excuse any errors that have escaped final proofreading.

## 2020-08-01 NOTE — ED NOTES
Patient brought to this nurse attention that she needs to be on Depakote that was started for her when she was seen at [facility]. I checked the chart review for verification and verbal order given by Dr. Rula Jaquez.

## 2020-08-02 ENCOUNTER — HOSPITAL ENCOUNTER (INPATIENT)
Age: 35
LOS: 2 days | Discharge: HOME | DRG: 753 | End: 2020-08-04
Attending: PSYCHIATRY & NEUROLOGY | Admitting: PSYCHIATRY & NEUROLOGY
Payer: MEDICAID

## 2020-08-02 VITALS
RESPIRATION RATE: 16 BRPM | OXYGEN SATURATION: 98 % | HEIGHT: 65 IN | SYSTOLIC BLOOD PRESSURE: 119 MMHG | DIASTOLIC BLOOD PRESSURE: 75 MMHG | WEIGHT: 195 LBS | TEMPERATURE: 97.4 F | BODY MASS INDEX: 32.49 KG/M2 | HEART RATE: 63 BPM

## 2020-08-02 PROBLEM — F31.9 BIPOLAR DISORDER (HCC): Status: ACTIVE | Noted: 2020-08-02

## 2020-08-02 LAB
ATRIAL RATE: 89 BPM
CALCULATED P AXIS, ECG09: 47 DEGREES
CALCULATED R AXIS, ECG10: 28 DEGREES
CALCULATED T AXIS, ECG11: 32 DEGREES
DIAGNOSIS, 93000: NORMAL
P-R INTERVAL, ECG05: 202 MS
Q-T INTERVAL, ECG07: 378 MS
QRS DURATION, ECG06: 82 MS
QTC CALCULATION (BEZET), ECG08: 459 MS
VENTRICULAR RATE, ECG03: 89 BPM

## 2020-08-02 PROCEDURE — 74011250637 HC RX REV CODE- 250/637: Performed by: PSYCHIATRY & NEUROLOGY

## 2020-08-02 PROCEDURE — 65220000005 HC RM SEMIPRIVATE PSYCH 3 OR 4 BED

## 2020-08-02 RX ORDER — BENZTROPINE MESYLATE 1 MG/ML
1 INJECTION INTRAMUSCULAR; INTRAVENOUS
Status: DISCONTINUED | OUTPATIENT
Start: 2020-08-02 | End: 2020-08-04 | Stop reason: HOSPADM

## 2020-08-02 RX ORDER — LANOLIN ALCOHOL/MO/W.PET/CERES
100 CREAM (GRAM) TOPICAL DAILY
Status: DISCONTINUED | OUTPATIENT
Start: 2020-08-02 | End: 2020-08-04 | Stop reason: HOSPADM

## 2020-08-02 RX ORDER — BENZTROPINE MESYLATE 1 MG/1
1 TABLET ORAL
Status: DISCONTINUED | OUTPATIENT
Start: 2020-08-02 | End: 2020-08-04 | Stop reason: HOSPADM

## 2020-08-02 RX ORDER — LORAZEPAM 1 MG/1
2 TABLET ORAL
Status: DISCONTINUED | OUTPATIENT
Start: 2020-08-02 | End: 2020-08-04 | Stop reason: HOSPADM

## 2020-08-02 RX ORDER — LORAZEPAM 1 MG/1
1 TABLET ORAL
Status: DISCONTINUED | OUTPATIENT
Start: 2020-08-02 | End: 2020-08-04 | Stop reason: HOSPADM

## 2020-08-02 RX ORDER — QUETIAPINE FUMARATE 25 MG/1
50 TABLET, FILM COATED ORAL
Status: DISCONTINUED | OUTPATIENT
Start: 2020-08-02 | End: 2020-08-04 | Stop reason: HOSPADM

## 2020-08-02 RX ORDER — FOLIC ACID 1 MG/1
1 TABLET ORAL DAILY
Status: DISCONTINUED | OUTPATIENT
Start: 2020-08-02 | End: 2020-08-04 | Stop reason: HOSPADM

## 2020-08-02 RX ORDER — HALOPERIDOL 5 MG/1
5 TABLET ORAL
Status: DISCONTINUED | OUTPATIENT
Start: 2020-08-02 | End: 2020-08-04 | Stop reason: HOSPADM

## 2020-08-02 RX ORDER — TRAZODONE HYDROCHLORIDE 50 MG/1
50 TABLET ORAL
Status: DISCONTINUED | OUTPATIENT
Start: 2020-08-02 | End: 2020-08-04 | Stop reason: HOSPADM

## 2020-08-02 RX ORDER — DIVALPROEX SODIUM 250 MG/1
250 TABLET, DELAYED RELEASE ORAL 2 TIMES DAILY
Status: DISCONTINUED | OUTPATIENT
Start: 2020-08-02 | End: 2020-08-03

## 2020-08-02 RX ORDER — HALOPERIDOL 5 MG/ML
5 INJECTION INTRAMUSCULAR
Status: DISCONTINUED | OUTPATIENT
Start: 2020-08-02 | End: 2020-08-04 | Stop reason: HOSPADM

## 2020-08-02 RX ORDER — IBUPROFEN 200 MG
1 TABLET ORAL DAILY
Status: DISCONTINUED | OUTPATIENT
Start: 2020-08-02 | End: 2020-08-04 | Stop reason: HOSPADM

## 2020-08-02 RX ORDER — IBUPROFEN 200 MG
1 TABLET ORAL DAILY
Status: DISCONTINUED | OUTPATIENT
Start: 2020-08-03 | End: 2020-08-02

## 2020-08-02 RX ORDER — HYDROXYZINE PAMOATE 50 MG/1
50 CAPSULE ORAL
Status: DISCONTINUED | OUTPATIENT
Start: 2020-08-02 | End: 2020-08-04 | Stop reason: HOSPADM

## 2020-08-02 RX ORDER — THERA TABS 400 MCG
1 TAB ORAL DAILY
Status: DISCONTINUED | OUTPATIENT
Start: 2020-08-02 | End: 2020-08-04 | Stop reason: HOSPADM

## 2020-08-02 RX ADMIN — THERA TABS 1 TABLET: TAB at 08:31

## 2020-08-02 RX ADMIN — Medication 100 MG: at 08:31

## 2020-08-02 RX ADMIN — LORAZEPAM 1 MG: 1 TABLET ORAL at 08:31

## 2020-08-02 RX ADMIN — FOLIC ACID 1 MG: 1 TABLET ORAL at 08:31

## 2020-08-02 RX ADMIN — DIVALPROEX SODIUM 250 MG: 250 TABLET, DELAYED RELEASE ORAL at 20:10

## 2020-08-02 RX ADMIN — QUETIAPINE FUMARATE 50 MG: 25 TABLET ORAL at 20:10

## 2020-08-02 RX ADMIN — LORAZEPAM 1 MG: 1 TABLET ORAL at 18:02

## 2020-08-02 RX ADMIN — DIVALPROEX SODIUM 250 MG: 250 TABLET, DELAYED RELEASE ORAL at 11:48

## 2020-08-02 NOTE — BH NOTES
Pt was admitted at approximately 12. She  appeared to have slept for 1 hour thus far. Pt is aware that her black crowbar WILL BE DISCARDED by security. Will continue to monitor for safety.

## 2020-08-02 NOTE — H&P
Psychiatry History and Physical    Subjective:     Date of Evaluation:  8/2/2020    Reason for Referral:  Vincenzo Cali was referred to the examiners from ER/MV for Depression/SI/ETOH abuse/detox. History of Presenting Problem: 36y/o AA female in NAD well developed and nourished. First time admit to MV/Psych. Has history of cutting self 2018. Covid -negative. Xray to left knee done in ER -no Fx. Denies SI/HI/AH/VH. Only physical complaint is left knee pain -history of Torn meniscus. Medical problems: Depression, ADHD, Bipolar, Borderline personality disorder. Patient Active Problem List    Diagnosis Date Noted    Bipolar disorder ([facility] 08/02/2020    Depression     Bipolar depression ([facility]     Anemia     ADHD (attention deficit hyperactivity disorder)     Miscarriage 09/01/2016     Past Medical History:   Diagnosis Date    ADHD (attention deficit hyperactivity disorder)     Anemia     Bipolar depression ([facility] [address]     Depression     Miscarriage 09/2016     Past Surgical History:   Procedure Laterality Date    ABDOMEN SURGERY PROC UNLISTED      HX GI      HX OTHER SURGICAL      rectal surgery for abscess    HX OTHER SURGICAL      diverticulum near ovary removed, transvaginal       Family History   Problem Relation Age of Onset    Hypertension Mother     Diabetes Mother     Diabetes Father     Hypertension Father     Stroke Maternal Grandmother       Social History     Tobacco Use    Smoking status: Current Every Day Smoker     Packs/day: 0.25     Years: 10.00     Pack years: 2.50     Last attempt to quit: 3/17/2017     Years since quitting: 3.3    Smokeless tobacco: Never Used    Tobacco comment: 2 cigarrettes/day. pt wants to quit   Substance Use Topics    Alcohol use: Yes     Comment: ocasionally     Prior to Admission medications    Medication Sig Start Date End Date Taking? Authorizing Provider   QUEtiapine (SEROqueL) 25 mg tablet Take 25 mg by mouth.     Other, MD Randal   OTHER     Other, MD Randal     Allergies   Allergen Reactions    Peanut Hives        Review of Systems - History obtained from chart review and the patient  General ROS: negative  Psychological ROS: positive for - depression  Ophthalmic ROS: negative  ENT ROS: negative  Respiratory ROS: no cough, shortness of breath, or wheezing  Cardiovascular ROS: no chest pain or dyspnea on exertion  Gastrointestinal ROS: no abdominal pain, change in bowel habits, or black or bloody stools  Genito-Urinary ROS: no dysuria, trouble voiding, or hematuria  Musculoskeletal ROS: positive for - joint pain  Neurological ROS: no TIA or stroke symptoms  Dermatological ROS: negative      Objective:     Patient Vitals for the past 8 hrs:   BP Temp Pulse Resp Height Weight   08/02/20 0823 128/83 97.5 °F (36.4 °C) 60 18     08/02/20 0436 120/76 98 °F (36.7 °C) 61 17 5' 5.5\" (1.664 m) 88.5 kg (195 lb)       Mental Status exam: WNL except for    Sensorium  oriented to time, place and person   Orientation person, place, time/date and situation   Relations cooperative   Eye Contact appropriate   Appearance:  age appropriate and within normal Limits   Motor Behavior:  gait stable and within normal limits   Speech:  normal volume   Vocabulary average   Thought Process: logical   Thought Content free of delusions and free of hallucinations   Suicidal ideations no plan  and no intention   Homicidal ideations no plan  and no intention   Mood:  depressed   Affect:  depressed   Memory recent  adequate   Memory remote:  adequate   Concentration:  adequate   Abstraction:  concrete   Insight:  good   Reliability good   Judgment:  good         Physical Exam:   Visit Vitals  /83 (BP 1 Location: Right arm, BP Patient Position: Sitting)   Pulse 60   Temp 97.5 °F (36.4 °C)   Resp 18   Ht 5' 5.5\" (1.664 m)   Wt 88.5 kg (195 lb)   Breastfeeding No   BMI 31.96 kg/m²     General appearance: alert, cooperative, no distress, appears stated age  Head: Normocephalic, without obvious abnormality, atraumatic  Eyes: negative  Ears: normal TM's and external ear canals AU  Nose: Nares normal. Septum midline. Mucosa normal. No drainage or sinus tenderness. Throat: Lips, mucosa, and tongue normal. Teeth and gums normal  Neck: supple, symmetrical, trachea midline, no adenopathy, thyroid: not enlarged, symmetric, no tenderness/mass/nodules, no carotid bruit and no JVD  Back: symmetric, no curvature. ROM normal. No CVA tenderness. Lungs: clear to auscultation bilaterally  Heart: regular rate and rhythm, S1, S2 normal, no murmur, click, rub or gallop  Abdomen: soft, non-tender. Bowel sounds normal. No masses,  no organomegaly  Extremities: extremities normal, atraumatic, no cyanosis or edema  Pulses: 2+ and symmetric  Skin: Skin color, texture, turgor normal. No rashes or lesions  Lymph nodes: Cervical, supraclavicular, and axillary nodes normal.  Neurologic: Grossly normal        Impression:      Active Problems:    Bipolar disorder ([facility] [address] (8/2/2020)          Plan:     Recommendations for Treatment/Conditions:  Psychiatric treatment recommended while in hospital  Admit to Psych services for Depression/SI and ETOH abuse-detox    Referral To:    Inpatient psychiatric care      Keysha Berumen NP   8/2/2020 11:00 AM

## 2020-08-02 NOTE — H&P
[address] [facility] [address]  Inpatient Admission Note    Date of Service:  08/02/20    Historian(s): Rock Serenity Schwartz and chart review  Referral Source:     Chief Complaint   I got upset and was drinking, hadn't taken my medicine, feeling suicidal.    History of Present Illness     López Shelby is a 38 y.o. BLACK OR  female with a history of ETOH abuse, Bipolar Disorder who presented to the emergency department endorsing anxiety, depression, suicidal ideation. Patient states that she has a severe problem with binge drinking alcohol, her last binge started two days ago and she drank until she was completely knocked out, waking up yesterday, she had a long conversation with her boyfriend and decided to get much needed help. Patient shared that she has been struggling with worsening depression and anxiety secondary to recent life stressors. Multiple life stressors include having three daughters ages 15, [de-identified], [de-identified] currently in the custody of her mother, inability to work secondary to bilateral ankle issues and left knee issues, currently applying for disability, relational stressor between she and her boyfriend secondary to her alcohol use, and inability to control her alcohol use and binge drinking. She states that it is very difficult for her to tell exactly how much she drinks because \"I just keep on drinking until I pass out completely. I blackout so I don't remember what I said what I did. I need to stop. \"     Psychiatric Review of Systems   Depression:  Reports depressed mood, episodic tearfulness, anhedonia and feelings of guilt, hopelessness, helplessness and worthlessness. Reports thoughts of self-harm or suicidal ideation. Anxiety: Reports anxiety. Irritability: Denied low threshold of frustration or anger. Bipolar symptoms: Reports history of decreased need for sleep associated with increased energy, racing thoughts, rapid speech and risky behavior.     Abuse/Trauma/PTSD: Denied history of verbal, physical or sexual abuse. Denies avoidant behavior related to trauma triggers, flashbacks, hypervigilance or nightmares. Psychosis: Denied auditory/visual hallucinations or delusions. Medical Review of Systems     Sleep: Variable  Appetite: Variable    10 point review of systems was completed. Significant findings are found in the HPI or MSE. Psychiatric Treatment History     Self-injurious behavior/risky thoughts or behaviors (past suicidal ideation/attempt):   Reports prior history of thoughts of self-harm or suicidal actions. Violence/Risk to others (past homicidal ideation/attempt):   Denies any prior history of violence or homicidal ideation. Previous psychiatric medication trials: Patient does not recall other than Depakote and Seroquel    Previous psychiatric hospitalizations: Last one recently 4 weeks ago    Current therapist: Denied    Current psychiatric provider: Denied    Allergies      Allergies   Allergen Reactions    Peanut Hives       Medical History     Past Medical History:   Diagnosis Date    ADHD (attention deficit hyperactivity disorder)     Anemia     Bipolar depression ([facility] [address]     Depression     Miscarriage 09/2016       History of neurological illness: Denied  History of head injuries: Denied     Medication(s)     Prior to Admission Medications   Prescriptions Last Dose Informant Patient Reported? Taking? OTHER Unknown at Unknown time  Yes No   QUEtiapine (SEROqueL) 25 mg tablet Unknown at Unknown time  Yes No   Sig: Take 25 mg by mouth.       Facility-Administered Medications: None         Substance Abuse History     Tobacco: 1/2 pack daily  Alcohol: Binge drinks 2 times weekely  Marijuana: Regular use  Cocaine: denied  Opiate: denied  Benzodiazepine: denied  Other: denied    Consequences: Denied    History of detox: Denied    History of substance abuse treatment: Denied    Family History     Family History   Problem Relation Age of Onset    Hypertension Mother     Diabetes Mother     Diabetes Father     Hypertension Father     Stroke Maternal Grandmother        Psychiatric Family History  Maternal: Maternal Grandfather committed suicide  Paternal: Denied    Family history of suicide? YES    Social History     Living Situation: Lives with boyfriend    Employment: Currently unemployed    Education: [employer] [place]      Relationships/Children: 3 children in [place] with mom    Legal: Denied    Spirituality/Faith:     Vitals/Labs      Vitals:    08/02/20 0436 08/02/20 0823   BP: 120/76 128/83   Pulse: 61 60   Resp: 17 18   Temp: 98 °F (36.7 °C) 97.5 °F (36.4 °C)   Weight: 88.5 kg (195 lb)    Height: 5' 5.5\" (1.664 m)        Labs:   Results for orders placed or performed during the hospital encounter of 07/31/20   SARS-COV-2, PCR    Specimen: Nasopharyngeal   Result Value Ref Range    Specimen source Nasopharyngeal      SARS-CoV-2 Not detected NOTD     CBC WITH AUTOMATED DIFF   Result Value Ref Range    WBC 8.6 4.6 - 13.2 K/uL    RBC 4.56 4.20 - 5.30 M/uL    HGB 11.2 (L) 12.0 - 16.0 g/dL    HCT 34.2 (L) 35.0 - 45.0 %    MCV 75.0 74.0 - 97.0 FL    MCH 24.6 24.0 - 34.0 PG    MCHC 32.7 31.0 - 37.0 g/dL    RDW 14.1 11.6 - 14.5 %    PLATELET 596 726 - 509 K/uL    MPV 12.1 (H) 9.2 - 11.8 FL    NEUTROPHILS 51 40 - 73 %    LYMPHOCYTES 41 21 - 52 %    MONOCYTES 6 3 - 10 %    EOSINOPHILS 1 0 - 5 %    BASOPHILS 1 0 - 2 %    ABS. NEUTROPHILS 4.5 1.8 - 8.0 K/UL    ABS. LYMPHOCYTES 3.5 0.9 - 3.6 K/UL    ABS. MONOCYTES 0.5 0.05 - 1.2 K/UL    ABS. EOSINOPHILS 0.1 0.0 - 0.4 K/UL    ABS.  BASOPHILS 0.0 0.0 - 0.1 K/UL    DF AUTOMATED     METABOLIC PANEL, COMPREHENSIVE   Result Value Ref Range    Sodium 142 136 - 145 mmol/L    Potassium 3.4 (L) 3.5 - 5.5 mmol/L    Chloride 111 100 - 111 mmol/L    CO2 23 21 - 32 mmol/L    Anion gap 8 3.0 - 18 mmol/L    Glucose 89 74 - 99 mg/dL    BUN 14 7.0 - 18 MG/DL    Creatinine 0.81 0.6 - 1.3 MG/DL    BUN/Creatinine ratio 17 12 - 20      GFR est AA >60 >60 ml/min/1.73m2    GFR est non-AA >60 >60 ml/min/1.73m2    Calcium 8.9 8.5 - 10.1 MG/DL    Bilirubin, total 0.4 0.2 - 1.0 MG/DL    ALT (SGPT) 39 13 - 56 U/L    AST (SGOT) 25 10 - 38 U/L    Alk. phosphatase 48 45 - 117 U/L    Protein, total 8.4 (H) 6.4 - 8.2 g/dL    Albumin 4.3 3.4 - 5.0 g/dL    Globulin 4.1 (H) 2.0 - 4.0 g/dL    A-G Ratio 1.0 0.8 - 1.7     CARDIAC PANEL,(CK, CKMB & TROPONIN)   Result Value Ref Range    CK - MB 1.4 <3.6 ng/ml    CK-MB Index 0.5 0.0 - 4.0 %     (H) 26 - 192 U/L    Troponin-I, QT <0.02 0.0 - 0.045 NG/ML   ETHYL ALCOHOL   Result Value Ref Range    ALCOHOL(ETHYL),SERUM 105 (H) 0 - 3 MG/DL   DRUG SCREEN, URINE   Result Value Ref Range    BENZODIAZEPINES Negative NEG      BARBITURATES Negative NEG      THC (TH-CANNABINOL) Positive (A) NEG      OPIATES Negative NEG      PCP(PHENCYCLIDINE) Negative NEG      COCAINE Positive (A) NEG      AMPHETAMINES Negative NEG      METHADONE Negative NEG      HDSCOM (NOTE)    HCG URINE, QL   Result Value Ref Range    HCG urine, QL Negative NEG     EKG, 12 LEAD, INITIAL   Result Value Ref Range    Ventricular Rate 89 BPM    Atrial Rate 89 BPM    P-R Interval 202 ms    QRS Duration 82 ms    Q-T Interval 378 ms    QTC Calculation (Bezet) 459 ms    Calculated P Axis 47 degrees    Calculated R Axis 28 degrees    Calculated T Axis 32 degrees    Diagnosis       Normal sinus rhythm  Normal ECG  When compared with ECG of 07-MAR-2016 10:36,  No significant change was found         Mental Status Examination     Appearance/Hygiene 38 y.o.  BLACK OR  female  Hygiene: Mildly disheveled   Behavior/Social Relatedness Appropriate   Musculoskeletal Gait/Station: appropriate  Tone (flaccid, cogwheeling, spastic): not assessed  Psychomotor (hyperkinetic, hypokinetic): calm  Involuntary movements (tics, dyskinesias, akathisa, stereotypies): none   Speech   Rate, rhythm, volume, fluency and articulation are appropriate   Mood   depressed   Affect depressed   Thought Process Linear and goal directed    Vagueness, incoherence, circumstantiality, tangentiality, neologisms, perseveration, flight of ideas, or self-contradictory statements not present on assessment   Thought Content and Perceptual Disturbances Denies delusions, ideas of reference, overvalued ideas, ruminations, obsession, compulsions, and phobias    Denies self-injurious behavior (SIB), suicidal ideation (SI), aggressive behavior or homicidal ideation (HI)    Denies auditory and visual hallucinations   Sensorium and Cognition  AOx4, attention intact, memory intact, language use appropriate, and fund of knowledge age appropriate   Insight  poor   Judgment poor       Suicide Risk Assessment     Admission  Date/Time: 08/02/20    [] Admission  [] Discharge     Key Factors:   Current admission precipitated by suicide attempt?   []  Yes     2    [x]  No     1     Suicide Attempt History  [] Past attempts of high lethality    2 [x]  Past attempts of low lethality    1 []  No previous attempts       0   Suicidal Ideation []  Constant suicidal thoughts      2 []  Intermittent or fleeting suicidal  thoughts  1 [x]  Denies current suicidal thoughts    0   Suicide Plan   []  Has plan with actual OR potential access to planned method    2 []  Has plan without access to planned method      1 [x]  No plan            0   Plan Lethality []  Highly lethal plan (Carbon monoxide, gun, hanging, jumping)    2 []  Moderate lethality of plan          1 [x]  Low lethality of plan (biting, head banging, superficial scratching, pillow over face)  0   Safety Plan Agreement  []  Unwilling OR unable to agree due to impaired reality testing   2   []  Patient is ambivalent and/or guarded      1 [x]  Reliably agrees        0   Current Morbid Thoughts (reunion fantasies, preoccupations with death) []  Constantly     2     []  Frequently    1 [x]  Rarely    0   Elopement Risk  []  High risk     2 []  Moderate risk    1 [x]   Low risk    0   Symptoms    []  Hopeless  []  Helpless  []  Anhedonia   []  Guilt/shame  []  Anger/rage  [x]  Anxiety  []  Insomnia   []  Agitation   []  Impulsivity  []  5-6 symptoms present    2 []  3-4 symptoms present    1  [x]  0-2 symptoms present    0     Total Score:   --------------------------------------------------------------------------------------------------------------  Subjective Appraisal of Risk:  []  Patient replies not trustworthy: several non-verbal cues. []  Patient replies questionable: trustworthy: at least 1 non-verbal cue. [x]  Patient replies appear trustworthy. Protective measures (select all that apply):  []  Successful past responses to stress  []  Spiritual/Baptist beliefs  []  Capacity for reality testing  []  Positive therapeutic relationships  []  Social supports/connections  []  Positive coping skills  []  Frustration tolerance/optimism  []  Children or pets in the home  []  Sense of responsibility to family  [x]  Agrees to treatment plan and follow up    High Risk Diagnoses (select all that apply):  [x]  Depression/Bipolar Disorder  [x]  Dual Diagnosis  []  Cardiovascular Disease  []  Schizophrenia  []  Chronic Pain  []  Epilepsy  []  Cancer  []  Personality Disorder  []  HIV/AIDS  []  Multiple Sclerosis    Dangerousness Assessment (Suicide, homicide, property destruction. ..)    Risk Factors reviewed and risk assessed to be:  [] low  [x] low-moderate  [] moderate   [] moderate-high  [] high     Protection factors reviewed and risk assessed to be:  [] low  [x] low-moderate  [] moderate   [] moderate-high  [] high     Response to treatment and risk assessed to be:  [] low  [x] low-moderate  [] moderate   [] moderate-high  [] high     Support reviewed and risk assessed to be:  [] low  [x] low-moderate  [] moderate   [] moderate-high  [] high     Acceptance of Discharge and outpatient treatment reviewed and risk assessed to be:    [] low  [x] low-moderate  [] moderate   [] moderate-high  [] high   Overall risk assessed to be:  [] low  [x] low-moderate  [] moderate   [] moderate-high  [] high       Assessment and Plan     Psychiatric Diagnoses:   Patient Active Problem List   Diagnosis Code    Depression F32.9    Miscarriage O03.9    Bipolar depression ([facility] F31.9    Anemia D64.9    ADHD (attention deficit hyperactivity disorder) F90.9    Bipolar disorder ([facility] F31.9       Psychosocial and contextual factors: Unemployed, Homeless, away from kids, substance use    Level of impairment/disability: Moderate - severe       1. Admit to locked inpatient behavioral health unit. Start milieu, group, art and occupation therapy. 2. Start home malissa of Depakote and Seroquel as per patient request.   3. Routine labs ordered and reviewed by this provider. 4. Reviewed instructions, risks, benefits and side effects. 5. Start disposition planning; verify upcoming outpatient appointments with therapist and/or psychiatric medication prescriber. Patient is very motivated to participate in inpatient substance abuse treatment after discharge.   6. Tentative date of discharge: 3-5 days       Torrey Cee MD  [address] [facility]

## 2020-08-02 NOTE — GROUP NOTE
IP  GROUP DOCUMENTATION INDIVIDUAL Group Therapy Note Date: 8/2/2020 Group Start Time: 1900 Group End Time: 1400 Group Topic: Nursing [facility] ADULT CHEM DEP Juan Aguirre RN 
 
[facility] GROUP DOCUMENTATION GROUP Group Therapy Note Attendees: 4 Attendance: Did not attend Festus Sotelo RN

## 2020-08-02 NOTE — BH NOTES
Admission note : SBAR received from Shahida Monet  From [facility] ED. Pt.reports BIpolar D. O. with depression IShe lives with he nsomnia unless she takes medication to help her sleep. She has 5 children who live in [place]. With her mother. Pt was admitted to [facility] the first of July for a similar presentation. She states she will drink every 3 or so days until intoxicated. She also use Marijuana daily. She has used cocaine in the past. She has fleeting suicidal ideation. She lives with her boyfriend.
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
## 2020-08-03 PROCEDURE — 65220000005 HC RM SEMIPRIVATE PSYCH 3 OR 4 BED

## 2020-08-03 PROCEDURE — 74011250637 HC RX REV CODE- 250/637: Performed by: PSYCHIATRY & NEUROLOGY

## 2020-08-03 RX ORDER — DIVALPROEX SODIUM 250 MG/1
500 TABLET, DELAYED RELEASE ORAL 2 TIMES DAILY
Status: DISCONTINUED | OUTPATIENT
Start: 2020-08-03 | End: 2020-08-04 | Stop reason: HOSPADM

## 2020-08-03 RX ADMIN — HYDROXYZINE PAMOATE 50 MG: 50 CAPSULE ORAL at 08:23

## 2020-08-03 RX ADMIN — Medication 100 MG: at 08:20

## 2020-08-03 RX ADMIN — QUETIAPINE FUMARATE 50 MG: 25 TABLET ORAL at 20:42

## 2020-08-03 RX ADMIN — DIVALPROEX SODIUM 500 MG: 250 TABLET, DELAYED RELEASE ORAL at 20:42

## 2020-08-03 RX ADMIN — FOLIC ACID 1 MG: 1 TABLET ORAL at 08:20

## 2020-08-03 RX ADMIN — THERA TABS 1 TABLET: TAB at 08:20

## 2020-08-03 RX ADMIN — DIVALPROEX SODIUM 250 MG: 250 TABLET, DELAYED RELEASE ORAL at 08:20

## 2020-08-03 NOTE — BH NOTES
Patient participated in all morning groups,she has been calm pleasant and  co-operative , she is med compliant, patient say she is feeling great, there are no issues or concerns to report.

## 2020-08-03 NOTE — PROGRESS NOTES
[address] [facility] [address]  Inpatient Progress Note     Date of Service: 08/03/20  Hospital Day: 1     Subjective/Interval History   08/03/20    Treatment Team Notes:  Notes reviewed and/or discussed and report that Arthur Hillman is a pt with a hx of a bipolar disorder, planning on returning back to [place], after talking to her ex BF  that Janine Hernandez assured me that he will stay in group home for an extended period of time. \"      Patient interview: Arthur Hillman was interviewed by this writer today. The pt began to drink several days ago, describing her alcohol use as a way to \"try to help her bipolar disorder. \"  The pt has responded to divalproex and quetiapine before. They have been started. Objective     Visit Vitals  /78 (BP 1 Location: Right arm, BP Patient Position: Sitting)   Pulse 77   Temp 98.5 °F (36.9 °C)   Resp 17   Ht 5' 5.5\" (1.664 m)   Wt 88.5 kg (195 lb)   SpO2 99%   Breastfeeding No   BMI 31.96 kg/m²     Vitals are stable. No results found for this or any previous visit (from the past 24 hour(s)). Mental Status Examination     Appearance/Hygiene 38 y.o. BLACK OR  female  Hygiene: fair. Behavior/Social Relatedness Appropriate   Musculoskeletal Gait/Station: appropriate  Tone (flaccid, cogwheeling, spastic): not assessed  Psychomotor (hyperkinetic, hypokinetic): calm   Involuntary movements (tics, dyskinesias, akathisa, stereotypies): none   Speech   Rate, rhythm, volume, fluency and articulation are appropriate   Mood   Less depressed. Affect    More stable. Thought Process Linear and goal directed   Thought Content and Perceptual Disturbances Denies self-injurious behavior (SIB), suicidal ideation (SI), aggressive behavior or homicidal ideation (HI)    Denies auditory and visual hallucinations   Sensorium and Cognition  Grossly intact   Insight  Improved. Judgment Improved.         Assessment/Plan      Psychiatric Diagnoses:   Patient Active Problem List   Diagnosis Code    Depression F32.9    Miscarriage O03.9    Bipolar depression ([facility] [address] F31.9    Anemia D64.9    ADHD (attention deficit hyperactivity disorder) F90.9    Bipolar disorder ([facility] F31.9       Medical Diagnoses: same. Psychosocial and contextual factors: same. Level of impairment/disability: moderate. 1.  Valproate dose will be increased. See orders. 2.  Reviewed instructions, risks, benefits and side effects of medications  3. Disposition/Discharge Date: self-care/home, back to [place], per pt's statements.     Lawyer Izabel MD, [facility]  Psychiatry

## 2020-08-03 NOTE — BSMART NOTE
[address] Biopsychosocial Assessment Current Level of Psychosocial Functioning  
 
[x]Independent 
[]Dependent []Minimal Assist 
 
 
Comments:   
 
Psychosocial High Risk Factors (check all that apply) []Unable to obtain meds                                                              
[x]Chronic illness/pain   
[x]Substance abuse  
[]Lack of Family Support []Financial stress []Isolation []Inadequate Community Resources 
[]Suicide attempt(s) []Not taking medications []Victim of crime []Developmental Delay 
[]Unable to manage personal needs []Age 72 or older  
[]  Homeless []Chari transportation []Readmission within 30 days []Unemployment []Traumatic Event Psychiatric Advanced Directive: see admission paperwork Family to involve in treatment: denies any Sexual Orientation:  heterosexual 
 
Patient Strengths: polite, good verbal, humor Patient Barriers: minimizes her alcohol abuse, limited insight, inconsistent tx Opiate education provided: in groups & IT Safety plan:contracts not harm self CMHC/MH history: prior inpt & outpt tx Plan of Care: 
medication management, group/individual therapies, family meetings, psycho -education, treatment team meetings to assist with stabilization Initial Discharge Plan:  Local CS which ever [facility] she stays Clinical Summary:  Vincenzo Cali is a 38 y.o. BLACK OR  female with a history of ETOH abuse, Bipolar Disorder who presented to the emergency department endorsing anxiety, depression, suicidal ideation. Patient states that she has a severe problem with binge drinking alcohol, her last binge started two days ago and she drank until she was completely knocked out, waking up yesterday, she had a long conversation with her boyfriend and decided to get much needed help.  Patient shared that she has been struggling with worsening depression and anxiety secondary to recent life stressors. Multiple life stressors include having three daughters ages 15, [de-identified], [de-identified] currently in the custody of her mother, inability to work secondary to bilateral ankle issues and left knee issues, currently applying for disability, relational stressor between she and her boyfriend secondary to her alcohol use, and inability to control her alcohol use and binge drinking. She states that it is very difficult for her to tell exactly how much she drinks because \"I just keep on drinking until I pass out completely. I blackout so I don't remember what I said what I did. I need to stop. \" Assessment /Intervention:  Explained to her tx program here & benefits of group & activity participation to help give her direction, support & relief. She  agreed to comply. Also emphasized need for consistent outpt tx. Dr & tx team given update.

## 2020-08-03 NOTE — BSMART NOTE
SOCIAL WORK GROUP THERAPY PROGRESS NOTE Group Time:  10:15am   
 
Group Topic:  Coping Skills    C D Issues Group Participation:   
 
Pt moderately involved during group discussion, mainly listened but remained attentive. \"Seven Steps\" for taking responsibility for our Happiness was reviewed including commitment to change, self-care, setting limits, goal setting & letting go. Admitted needed to focus more on self. Stated has good support network. Reviewed strategies to keep a \"Journal\" for moods, cognitions, behavior & outcome.

## 2020-08-03 NOTE — BSMART NOTE
OCCUPATIONAL THERAPY PROGRESS NOTE Group Time:  7488 Attendance: The patient attended full group. Participation: The patient participated fully in the activity. Attention: The patient was able to focus on the activity. Interaction: The patient occasionally  interacts with others. Appropriate and focused and able to ID some issues to work on.

## 2020-08-03 NOTE — BH NOTES
Patient spend most of the evening on the unit interacting with peers and watching television. Patient attended groups and interacted appropriately. Staff will continue to monitor patient for safety.

## 2020-08-04 VITALS
HEIGHT: 66 IN | BODY MASS INDEX: 31.34 KG/M2 | SYSTOLIC BLOOD PRESSURE: 125 MMHG | DIASTOLIC BLOOD PRESSURE: 73 MMHG | OXYGEN SATURATION: 98 % | TEMPERATURE: 97.3 F | WEIGHT: 195 LBS | HEART RATE: 67 BPM | RESPIRATION RATE: 18 BRPM

## 2020-08-04 PROBLEM — F31.9 BIPOLAR DISORDER (HCC): Status: RESOLVED | Noted: 2020-08-02 | Resolved: 2020-08-04

## 2020-08-04 PROBLEM — F31.4 BIPOLAR I DISORDER, MOST RECENT EPISODE DEPRESSED, SEVERE WITHOUT PSYCHOTIC FEATURES (HCC): Status: ACTIVE | Noted: 2020-08-04

## 2020-08-04 PROCEDURE — 74011250637 HC RX REV CODE- 250/637: Performed by: PSYCHIATRY & NEUROLOGY

## 2020-08-04 RX ORDER — LANOLIN ALCOHOL/MO/W.PET/CERES
1 CREAM (GRAM) TOPICAL
Status: DISCONTINUED | OUTPATIENT
Start: 2020-08-04 | End: 2020-08-04 | Stop reason: HOSPADM

## 2020-08-04 RX ORDER — DIVALPROEX SODIUM 500 MG/1
500 TABLET, DELAYED RELEASE ORAL 2 TIMES DAILY
Qty: 60 TAB | Refills: 0 | Status: SHIPPED | OUTPATIENT
Start: 2020-08-04 | End: 2020-12-14

## 2020-08-04 RX ORDER — QUETIAPINE FUMARATE 50 MG/1
50 TABLET, FILM COATED ORAL
Qty: 30 TAB | Refills: 0 | Status: SHIPPED | OUTPATIENT
Start: 2020-08-04 | End: 2020-12-14

## 2020-08-04 RX ORDER — LANOLIN ALCOHOL/MO/W.PET/CERES
325 CREAM (GRAM) TOPICAL
Qty: 30 TAB | Refills: 0 | Status: SHIPPED | OUTPATIENT
Start: 2020-08-04 | End: 2020-12-14

## 2020-08-04 RX ORDER — THERA TABS 400 MCG
1 TAB ORAL DAILY
Qty: 30 TAB | Refills: 0 | Status: SHIPPED | OUTPATIENT
Start: 2020-08-05 | End: 2020-12-14

## 2020-08-04 RX ORDER — LANOLIN ALCOHOL/MO/W.PET/CERES
100 CREAM (GRAM) TOPICAL DAILY
Qty: 30 TAB | Refills: 0 | Status: SHIPPED | OUTPATIENT
Start: 2020-08-05 | End: 2020-12-14

## 2020-08-04 RX ADMIN — DIVALPROEX SODIUM 500 MG: 250 TABLET, DELAYED RELEASE ORAL at 08:03

## 2020-08-04 RX ADMIN — THERA TABS 1 TABLET: TAB at 08:03

## 2020-08-04 RX ADMIN — FERROUS SULFATE TAB 325 MG (65 MG ELEMENTAL FE) 325 MG: 325 (65 FE) TAB at 09:25

## 2020-08-04 RX ADMIN — Medication 100 MG: at 08:03

## 2020-08-04 RX ADMIN — FOLIC ACID 1 MG: 1 TABLET ORAL at 08:03

## 2020-08-04 NOTE — PROGRESS NOTES
Problem: Alcohol Withdrawal  Goal: *STG: Participates in treatment plan  Description: Patient will participate in treatment plan daily during hospitalization  Outcome: Progressing Towards Goal     Problem: Depressed Mood (Adult/Pediatric)  Goal: *STG: Attends activities and groups  Description: Patient will attend at least 2 groups daily. Outcome: Progressing Towards Goal     Problem: Depressed Mood (Adult/Pediatric)  Goal: *STG: Complies with medication therapy  Description: Will take scheduled medications daily as ordered during her hospital stay under direct supervision. Outcome: Progressing Towards Goal   Patient pleasant and cooperative. States she is looking forward to discharge tomorrow and states she feels much better. Denies SI/HI/AH. Contracts for safety. Social able with peers/staff. Will continue to monitor.
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
## 2020-08-04 NOTE — GROUP NOTE
[facility] GROUP DOCUMENTATION INDIVIDUAL Group Therapy Note Date: 8/3/2020 Group Start Time: 6394 Group End Time: 1900 Group Topic: Nursing [address] ADULT CHEM DEP Yaneli Soliz, RN 
 
[facility] GROUP DOCUMENTATION GROUP Group Therapy Note Attendees: 7 Attendance: Attended Interventions/techniques: Challenged and Informed Follows Directions: Followed directions Interactions: Interacted appropriately Mental Status: Calm Behavior/appearance: Attentive Goals Achieved: Able to listen to others Jaspal Lorenz.  Li Lennon

## 2020-08-04 NOTE — PROGRESS NOTES
Pt's discharge instructions, prescriptions, valuables and belongings given. Pt verbalizes understanding. Pt's arm band removed and shredded. pt. escorted out by staff ambulatory to her ride.
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
## 2020-11-12 ENCOUNTER — VIRTUAL VISIT (OUTPATIENT)
Dept: FAMILY MEDICINE CLINIC | Age: 35
End: 2020-11-12
Payer: MEDICAID

## 2020-11-12 ENCOUNTER — TELEPHONE (OUTPATIENT)
Dept: FAMILY MEDICINE CLINIC | Age: 35
End: 2020-11-12

## 2020-11-12 DIAGNOSIS — F31.4 BIPOLAR I DISORDER, MOST RECENT EPISODE DEPRESSED, SEVERE WITHOUT PSYCHOTIC FEATURES (HCC): Primary | ICD-10-CM

## 2020-11-12 DIAGNOSIS — Z00.00 PREVENTATIVE HEALTH CARE: ICD-10-CM

## 2020-11-12 PROCEDURE — 99213 OFFICE O/P EST LOW 20 MIN: CPT | Performed by: NURSE PRACTITIONER

## 2020-11-12 NOTE — TELEPHONE ENCOUNTER
Called to schedule appointment for physical on Tues 17 @ 3:15. Sister will have patient return call to verify appointment.

## 2020-11-12 NOTE — PROGRESS NOTES
HISTORY OF PRESENT ILLNESS  Margoth Vazquez is a 38 y.o. female   Margoth Vazquez, who was evaluated through a synchronous (real-time) audio-video encounter, and/or her healthcare decision maker, is aware that it is a billable service, with coverage as determined by her insurance carrier. She provided verbal consent to proceed: Yes, and patient identification was verified. It was conducted pursuant to the emergency declaration under the [facility], [address] [place] [address] and the [employer] Act. A caregiver was present when appropriate. Ability to conduct physical exam was limited. I was in the office. The patient was at home. HPI  Patient reports that she would like to initiate primary care services. She is also interested in behavioral health services with referral. Referral to Dr. Austyn Valenzuela at [phone]. Pt's new number is [phone]. Pt says she is well, working and in recovery at [facility] in [address]. She is allowed to work with this program outside of the campus. She reports she has been clean of drugs and alcohol and she is not currently sexually active. After completing the program she would like to return to [place] before [place] this year and get custody of her children and come back to [place]. Patient denies chills, fever, shortness of breath, abdomen pain or chest pain. She said she is doing well on her Depakote, sleeps well, her appetite is good, regular bowel movements and urinating is all normal.    She would like to establish primary care today, schedule for a physical exam with a Pap smear and STD testing. In addition she would like a referral for behavioral health for her mental health medication management. Patient has no other concerns at this time. Review of Systems   Constitutional: Negative for chills and fever.    HENT: Negative for congestion, ear pain and sinus pain. Eyes: Negative. Respiratory: Negative. Cardiovascular: Negative. Gastrointestinal: Negative. Genitourinary: Negative. Musculoskeletal: Negative. Skin: Negative. Neurological: Negative. Endo/Heme/Allergies: Negative. Psychiatric/Behavioral: Negative for depression and suicidal ideas. There were no vitals taken for this visit. Physical Exam  Constitutional:       Appearance: Normal appearance. Eyes:      Conjunctiva/sclera: Conjunctivae normal.   Pulmonary:      Effort: Pulmonary effort is normal.   Neurological:      Mental Status: She is alert and oriented to person, place, and time. Psychiatric:         Mood and Affect: Mood normal.         Behavior: Behavior normal.         Thought Content: Thought content normal.         Judgment: Judgment normal.       Past Medical History:   Diagnosis Date    ADHD (attention deficit hyperactivity disorder)     Anemia     Bipolar depression ([facility]     Depression     Miscarriage 09/2016     Current Outpatient Medications on File Prior to Visit   Medication Sig Dispense Refill    QUEtiapine (SEROquel) 50 mg tablet Take 1 Tab by mouth nightly. Indications: bipolar depression 30 Tab 0    divalproex DR (DEPAKOTE) 500 mg tablet Take 1 Tab by mouth two (2) times a day. Indications: bipolar depression 60 Tab 0    ferrous sulfate 325 mg (65 mg iron) tablet Take 1 Tab by mouth daily (with breakfast). Indications: anemia from inadequate iron 30 Tab 0    therapeutic multivitamin (THERAGRAN) tablet Take 1 Tab by mouth daily. Indications: treatment to prevent vitamin deficiency 30 Tab 0    thiamine HCL (B-1) 100 mg tablet Take 1 Tab by mouth daily. Indications: alcohol use disorder. 30 Tab 0     No current facility-administered medications on file prior to visit. ASSESSMENT and PLAN    ICD-10-CM ICD-9-CM    1.  Bipolar I disorder, most recent episode depressed, severe without psychotic features ([facility]  F31.4 296.53    50% of 15 minutes was spent on counseling and managing of care as seen above. Patient will be seen face-to-face next week for a physical, STD testing and a Pap smear. She reports that she will contact Dr. Linda Fischer office for an appointment. Referral was sent today.

## 2020-12-14 ENCOUNTER — HOSPITAL ENCOUNTER (EMERGENCY)
Age: 35
Discharge: HOME | End: 2020-12-14
Attending: EMERGENCY MEDICINE
Payer: MEDICAID

## 2020-12-14 VITALS
OXYGEN SATURATION: 100 % | DIASTOLIC BLOOD PRESSURE: 71 MMHG | TEMPERATURE: 97.6 F | HEART RATE: 74 BPM | SYSTOLIC BLOOD PRESSURE: 122 MMHG | RESPIRATION RATE: 18 BRPM

## 2020-12-14 DIAGNOSIS — Z86.59 HISTORY OF BIPOLAR DISORDER: ICD-10-CM

## 2020-12-14 DIAGNOSIS — Z76.0 MEDICATION REFILL: Primary | ICD-10-CM

## 2020-12-14 DIAGNOSIS — F14.11 HISTORY OF COCAINE ABUSE (HCC): ICD-10-CM

## 2020-12-14 DIAGNOSIS — F10.11 HISTORY OF ALCOHOL ABUSE: ICD-10-CM

## 2020-12-14 PROCEDURE — 99281 EMR DPT VST MAYX REQ PHY/QHP: CPT

## 2020-12-14 RX ORDER — LANOLIN ALCOHOL/MO/W.PET/CERES
100 CREAM (GRAM) TOPICAL DAILY
Qty: 30 TAB | Refills: 0 | Status: SHIPPED | OUTPATIENT
Start: 2020-12-14

## 2020-12-14 RX ORDER — LANOLIN ALCOHOL/MO/W.PET/CERES
325 CREAM (GRAM) TOPICAL
Qty: 30 TAB | Refills: 0 | Status: SHIPPED | OUTPATIENT
Start: 2020-12-14

## 2020-12-14 RX ORDER — THERA TABS 400 MCG
1 TAB ORAL DAILY
Qty: 30 TAB | Refills: 0 | Status: SHIPPED | OUTPATIENT
Start: 2020-12-14

## 2020-12-14 RX ORDER — NALTREXONE HYDROCHLORIDE 50 MG/1
TABLET, FILM COATED ORAL
COMMUNITY
Start: 2020-10-23

## 2020-12-14 RX ORDER — QUETIAPINE FUMARATE 50 MG/1
50 TABLET, FILM COATED ORAL
Qty: 30 TAB | Refills: 0 | Status: SHIPPED | OUTPATIENT
Start: 2020-12-14

## 2020-12-14 RX ORDER — HYDROXYZINE 25 MG/1
TABLET, FILM COATED ORAL
COMMUNITY
Start: 2020-09-29

## 2020-12-14 RX ORDER — DIVALPROEX SODIUM 500 MG/1
500 TABLET, DELAYED RELEASE ORAL 2 TIMES DAILY
Qty: 60 TAB | Refills: 0 | Status: SHIPPED | OUTPATIENT
Start: 2020-12-14

## 2020-12-14 NOTE — DISCHARGE INSTRUCTIONS
SPECIFIC PATIENT INSTRUCTIONS FROM THE PHYSICIAN WHO TREATED YOU IN THE ER TODAY:  1. Return if any concerns or worsening of condition(s). 2.  Take the medication(s) as prescribed. 3.  FOLLOW UP APPOINTMENT:  Your primary doctor in 1-2 weeks. 4.  Do not run out of your medications that have been prescribed to you. When you have only a couple weeks of on a medication remaining before you run out of it, then call the physician who prescribed it to allow enough time for the your physician to rewrite for the prescription (before you run out of it). Patient Education        Medication Refill: Care Instructions  Your Care Instructions     Medicines are a big part of treatment for many health problems. So it can be upsetting to run out of your medicine. It may even be dangerous to stop a medicine suddenly. The doctor may have given you enough medicine to help you until you can see your regular doctor. The doctor has checked you carefully, but problems can develop later. If you notice any problems or new symptoms, get medical treatment right away. Follow-up care is a key part of your treatment and safety. Be sure to make and go to all appointments, and call your doctor if you are having problems. It's also a good idea to know your test results and keep a list of the medicines you take. How can you care for yourself at home? · Be safe with medicines. Take your medicines exactly as prescribed. · Know when you will run out of your medicine. Use a calendar to remind you to get a refill. Don't wait until you have only a few pills left. · Talk with your doctor or pharmacist about your medicine. Find out what to do if you miss a dose. When should you call for help? Call your doctor now or seek immediate medical care if:    · You have problems with your medicine. Watch closely for changes in your health, and be sure to contact your doctor if you have any problems. Where can you learn more?   Go to http://www.gray.com/  Enter K326 in the search box to learn more about \"Medication Refill: Care Instructions. \"  Current as of: 2019               Content Version: 12.6  © 4890-6121 Healthwise, Incorporated. Care instructions adapted under license by [employer] (which disclaims liability or warranty for this information). If you have questions about a medical condition or this instruction, always ask your healthcare professional. [address] any warranty or liability for your use of this information. Neterion Activation    Thank you for requesting access to Neterion. Please follow the instructions below to securely access and download your online medical record. Neterion allows you to send messages to your doctor, view your test results, renew your prescriptions, schedule appointments, and more. How Do I Sign Up? In your internet browser, go to [email or web address]. Click on the First Time User? Click Here link in the Sign In box. You will see the New Member Sign Up page. Enter your Neterion Access Code exactly as it appears below. You will not need to use this code after you´ve completed the sign-up process. If you do not sign up before the expiration date, you must request a new code. Neterion Access Code: [email or web address]  Expires: 3/28/2019  2:27 PM (This is the date your Neterion access code will )    Enter the last four digits of your Social Security Number (xxxx) and Date of Birth (mm/dd/yyyy) as indicated and click Submit. You will be taken to the next sign-up page. Create a Neterion ID. This will be your Neterion login ID and cannot be changed, so think of one that is secure and easy to remember. Create a Neterion password. You can change your password at any time. Enter your Password Reset Question and Answer. This can be used at a later time if you forget your password.    Enter your e-mail address. You will receive e-mail notification when new information is available in [address] Click Sign Up. You can now view and download portions of your medical record. Click the UberGrape link to download a portable copy of your medical information. Additional Information    If you have questions, please visit the Frequently Asked Questions section of the Naytev website at [email or web address] Remember, Naytev is NOT to be used for urgent needs. For medical emergencies, dial 911.

## 2020-12-14 NOTE — ED PROVIDER NOTES
[facility] EMERGENCY DEPT      9:22 AM    Date: 12/14/2020  Patient Name: Juancho Hopson    History of Presenting Illness     Chief Complaint   Patient presents with    Medication Refill       38 y.o. female with noted past medical history who presents to the emergency department for medication refill. The patient recently was discharged from a alcohol and drug rehab center and states she has been clean for 3 months. She states that she could not get her prescriptions filled she did try to get a psychiatrist to help with that but has not been able to. She like to get the pill before she moved back to [place] in the near future. She currently has no complaints. She denies any suicidal homicidal ideation as well. Patient denies any other associated signs or symptoms. Patient denies any other complaints. Nursing notes regarding the HPI and triage nursing notes were reviewed. Prior medical records were reviewed. Current Outpatient Medications   Medication Sig Dispense Refill    naltrexone (DEPADE) 50 mg tablet       hydrOXYzine HCL (ATARAX) 25 mg tablet       QUEtiapine (SEROquel) 50 mg tablet Take 1 Tab by mouth nightly. Indications: bipolar depression 30 Tab 0    divalproex DR (DEPAKOTE) 500 mg tablet Take 1 Tab by mouth two (2) times a day. Indications: bipolar depression 60 Tab 0    ferrous sulfate 325 mg (65 mg iron) tablet Take 1 Tab by mouth daily (with breakfast). Indications: anemia from inadequate iron 30 Tab 0    therapeutic multivitamin (THERAGRAN) tablet Take 1 Tab by mouth daily. Indications: treatment to prevent vitamin deficiency 30 Tab 0    thiamine HCL (B-1) 100 mg tablet Take 1 Tab by mouth daily. Indications: alcohol use disorder.  30 Tab 0       Past History     Past Medical History:  Past Medical History:   Diagnosis Date    ADHD (attention deficit hyperactivity disorder)     Anemia     [facility]     Depression     Miscarriage 09/2016 Past Surgical History:  Past Surgical History:   Procedure Laterality Date    ABDOMEN SURGERY PROC UNLISTED      HX GI      HX OTHER SURGICAL      rectal surgery for abscess    HX OTHER SURGICAL      diverticulum near ovary removed, transvaginal       Family History:  Family History   Problem Relation Age of Onset    Hypertension Mother     Diabetes Mother     Diabetes Father     Hypertension Father     Stroke Maternal Grandmother        Social History:  Social History     Tobacco Use    Smoking status: Current Every Day Smoker     Packs/day: 0.25     Years: 10.00     Pack years: 2.50     Last attempt to quit: 3/17/2017     Years since quitting: 3.7    Smokeless tobacco: Never Used    Tobacco comment: 2 cigarrettes/day. pt wants to quit   Substance Use Topics    Alcohol use: Yes     Comment: ocasionally    Drug use: No       Allergies: Allergies   Allergen Reactions    Peanut Hives       Patient's primary care provider (as noted in EPIC):  Jyoti Petit NP    Review of Systems   Constitutional: Negative for diaphoresis. HENT: Negative for congestion. Eyes: Negative for discharge. Respiratory: Negative for stridor. Cardiovascular: Negative for palpitations. Gastrointestinal: Negative for diarrhea. Endocrine: Negative for heat intolerance. Genitourinary: Negative for flank pain. Musculoskeletal: Negative for back pain. Neurological: Negative for weakness. Psychiatric/Behavioral: Negative for hallucinations. All other systems reviewed and are negative. Visit Vitals  /71   Pulse 74   Temp 97.6 °F (36.4 °C)   Resp 18   SpO2 100%       PHYSICAL EXAM:    CONSTITUTIONAL:  Alert, in no apparent distress;  well developed;  well nourished. HEAD:  Normocephalic, atraumatic. EYES:  EOMI. Non-icteric sclera. Normal conjunctiva. ENTM:  Nose:  no rhinorrhea. Throat:  no erythema or exudate, mucous membranes moist.  NECK:  No JVD.   Supple  RESPIRATORY:  Chest clear, equal breath sounds, good air movement. CARDIOVASCULAR:  Regular rate and rhythm. No murmurs, rubs, or gallops. GI:  Normal bowel sounds, abdomen soft and non-tender. No rebound or guarding. BACK:  Non-tender. UPPER EXT:  Normal inspection. LOWER EXT:  No edema, no calf tenderness. Distal pulses intact. NEURO:  Moves all four extremities, and grossly normal motor exam.  SKIN:  No rashes;  Normal for age. PSYCH:  Alert and normal affect. Diagnostic Study Results     Abnormal lab results from this emergency department encounter:  Labs Reviewed - No data to display    Lab values for this patient within approximately the last 12 hours:  No results found for this or any previous visit (from the past 12 hour(s)). Radiologist and cardiologist interpretations if available at time of this note:  No results found. Medication(s) ordered for patient during this emergency visit encounter:  Medications - No data to display    Medical Decision Making     I am the first provider for this patient. I reviewed the vital signs, available nursing notes, past medical history, past surgical history, family history and social history. Vital Signs:  Reviewed the patient's vital signs. IMPRESSION AND MEDICAL DECISION MAKING:  Based upon the patient's presentation with noted HPI and PE, along with the work up done in the emergency department, the patient is seeking a medication refill. DIAGNOSIS:  1. Medication refill, multiple medications    SPECIFIC PATIENT INSTRUCTIONS FROM THE PHYSICIAN WHO TREATED YOU IN THE ER TODAY:  1. Return if any concerns or worsening of condition(s). 2.  Take the medication(s) as prescribed. 3.  FOLLOW UP APPOINTMENT:  Your primary doctor in 1-2 weeks. 4.  Do not run out of your medications that have been prescribed to you.   When you have only a couple weeks of on a medication remaining before you run out of it, then call the physician who prescribed it to allow enough time for the your physician to rewrite for the prescription (before you run out of it). Patient is improved, resting quietly and comfortably. The patient will be discharged home. The patient was reassured that these symptoms do not appear to represent a serious or life threatening condition at this time. Warning signs of worsening condition were discussed and understood by the patient. Based on patient's age, coexisting illness, exam, and the results of this ED evaluation, the decision to treat as an outpatient was made. Based on the information available at time of discharge, acute pathology requiring immediate intervention was deemed relative unlikely. While it is impossible to completely exclude the possibility of underlying serious disease or worsening of condition, I feel the relative likelihood is extremely low. I discussed this uncertainty with the patient, who understood ED evaluation and treatment and felt comfortable with the outpatient treatment plan. All questions regarding care, test results, and follow up were answered. The patient is stable and appropriate to discharge. They understand that they should return to the emergency department for any new or worsening symptoms. I stressed the importance of follow up for repeat assessment and possibly further evaluation/treatment. Dictation disclaimer:  Please note that this dictation was completed with [employer], the [employer] voice recognition software. Quite often unanticipated grammatical, syntax, homophones, and other interpretive errors are inadvertently transcribed by the computer software. Please disregard these errors. Please excuse any errors that have escaped final proofreading. Coding Diagnoses     Clinical Impression:   1. Medication refill    2. History of bipolar disorder    3. History of alcohol abuse    4. [facility] ([facility]        Disposition     Disposition: Discharge. CHACORTA Rob Board Certified Emergency Physician    Provider Attestation:  If a scribe was utilized in generation of this patient record, I personally performed the services described in the documentation, reviewed the documentation, as recorded by the scribe in my presence, and it accurately records the patient's history of presenting illness, review of systems, patient physical examination, and procedures performed by me as the attending physician. Loy Walker M.D.   CHRISTOPHER Board Certified Emergency Physician  12/14/2020.  9:23 AM

## 2022-03-19 PROBLEM — F31.4 BIPOLAR I DISORDER, MOST RECENT EPISODE DEPRESSED, SEVERE WITHOUT PSYCHOTIC FEATURES (HCC): Status: ACTIVE | Noted: 2020-08-04

## 2023-07-27 ENCOUNTER — OFFICE VISIT (OUTPATIENT)
Facility: CLINIC | Age: 38
End: 2023-07-27
Payer: COMMERCIAL

## 2023-07-27 VITALS
SYSTOLIC BLOOD PRESSURE: 132 MMHG | HEART RATE: 61 BPM | WEIGHT: 197 LBS | OXYGEN SATURATION: 97 % | DIASTOLIC BLOOD PRESSURE: 84 MMHG | TEMPERATURE: 97 F | BODY MASS INDEX: 32.82 KG/M2 | HEIGHT: 65 IN | RESPIRATION RATE: 16 BRPM

## 2023-07-27 DIAGNOSIS — F51.05 INSOMNIA DUE TO OTHER MENTAL DISORDER: ICD-10-CM

## 2023-07-27 DIAGNOSIS — F99 INSOMNIA DUE TO OTHER MENTAL DISORDER: ICD-10-CM

## 2023-07-27 DIAGNOSIS — Z12.31 ENCOUNTER FOR SCREENING MAMMOGRAM FOR BREAST CANCER: Primary | ICD-10-CM

## 2023-07-27 DIAGNOSIS — B35.1 ONYCHOMYCOSIS: ICD-10-CM

## 2023-07-27 DIAGNOSIS — F31.4 BIPOLAR I DISORDER, MOST RECENT EPISODE DEPRESSED, SEVERE WITHOUT PSYCHOTIC FEATURES (HCC): ICD-10-CM

## 2023-07-27 DIAGNOSIS — F19.10 POLYSUBSTANCE ABUSE (HCC): ICD-10-CM

## 2023-07-27 PROBLEM — F60.3 BORDERLINE PERSONALITY DISORDER (HCC): Status: ACTIVE | Noted: 2020-07-02

## 2023-07-27 PROBLEM — F12.20 CANNABIS USE DISORDER, SEVERE, DEPENDENCE (HCC): Status: ACTIVE | Noted: 2020-07-02

## 2023-07-27 PROBLEM — F17.200 TOBACCO USE DISORDER: Status: ACTIVE | Noted: 2023-07-27

## 2023-07-27 PROBLEM — F31.9 BIPOLAR 1 DISORDER (HCC): Status: ACTIVE | Noted: 2020-07-02

## 2023-07-27 PROBLEM — F31.62 BIPOLAR DISORDER, CURRENT EPISODE MIXED, MODERATE (HCC): Status: ACTIVE | Noted: 2020-07-02

## 2023-07-27 PROBLEM — F32.A DEPRESSION: Status: ACTIVE | Noted: 2022-07-07

## 2023-07-27 PROBLEM — F10.939 ALCOHOL WITHDRAWAL (HCC): Status: ACTIVE | Noted: 2020-07-02

## 2023-07-27 PROBLEM — F41.9 ANXIETY: Status: ACTIVE | Noted: 2020-09-23

## 2023-07-27 PROBLEM — Z59.10 INADEQUATE HOUSING: Status: ACTIVE | Noted: 2020-09-23

## 2023-07-27 PROBLEM — R45.89 SUICIDAL BEHAVIOR: Status: ACTIVE | Noted: 2022-05-04

## 2023-07-27 PROBLEM — F14.10 COCAINE ABUSE (HCC): Status: ACTIVE | Noted: 2020-07-02

## 2023-07-27 PROCEDURE — 99204 OFFICE O/P NEW MOD 45 MIN: CPT | Performed by: STUDENT IN AN ORGANIZED HEALTH CARE EDUCATION/TRAINING PROGRAM

## 2023-07-27 RX ORDER — ALBUTEROL SULFATE 90 UG/1
1-2 AEROSOL, METERED RESPIRATORY (INHALATION) EVERY 6 HOURS
COMMUNITY
Start: 2023-05-26

## 2023-07-27 RX ORDER — BUDESONIDE AND FORMOTEROL FUMARATE DIHYDRATE 160; 4.5 UG/1; UG/1
2 AEROSOL RESPIRATORY (INHALATION) 2 TIMES DAILY
COMMUNITY
Start: 2023-05-26

## 2023-07-27 RX ORDER — PRENATAL VIT 91/IRON/FOLIC/DHA 28-975-200
COMBINATION PACKAGE (EA) ORAL
Qty: 42 G | Refills: 1 | Status: SHIPPED | OUTPATIENT
Start: 2023-07-27

## 2023-07-27 RX ORDER — PRAZOSIN HYDROCHLORIDE 1 MG/1
CAPSULE ORAL
COMMUNITY
Start: 2023-07-11

## 2023-07-27 RX ORDER — QUETIAPINE FUMARATE 100 MG/1
100 TABLET, FILM COATED ORAL NIGHTLY
Qty: 60 TABLET | Refills: 0 | Status: SHIPPED | OUTPATIENT
Start: 2023-07-27

## 2023-07-27 SDOH — ECONOMIC STABILITY: FOOD INSECURITY: WITHIN THE PAST 12 MONTHS, YOU WORRIED THAT YOUR FOOD WOULD RUN OUT BEFORE YOU GOT MONEY TO BUY MORE.: SOMETIMES TRUE

## 2023-07-27 SDOH — ECONOMIC STABILITY: FOOD INSECURITY: WITHIN THE PAST 12 MONTHS, THE FOOD YOU BOUGHT JUST DIDN'T LAST AND YOU DIDN'T HAVE MONEY TO GET MORE.: SOMETIMES TRUE

## 2023-07-27 SDOH — ECONOMIC STABILITY: HOUSING INSECURITY
IN THE LAST 12 MONTHS, WAS THERE A TIME WHEN YOU DID NOT HAVE A STEADY PLACE TO SLEEP OR SLEPT IN A SHELTER (INCLUDING NOW)?: NO

## 2023-07-27 SDOH — ECONOMIC STABILITY: INCOME INSECURITY: HOW HARD IS IT FOR YOU TO PAY FOR THE VERY BASICS LIKE FOOD, HOUSING, MEDICAL CARE, AND HEATING?: VERY HARD

## 2023-07-27 ASSESSMENT — PATIENT HEALTH QUESTIONNAIRE - PHQ9
1. LITTLE INTEREST OR PLEASURE IN DOING THINGS: 3
9. THOUGHTS THAT YOU WOULD BE BETTER OFF DEAD, OR OF HURTING YOURSELF: 0
SUM OF ALL RESPONSES TO PHQ QUESTIONS 1-9: 6
3. TROUBLE FALLING OR STAYING ASLEEP: 3
5. POOR APPETITE OR OVEREATING: 0
SUM OF ALL RESPONSES TO PHQ QUESTIONS 1-9: 6
SUM OF ALL RESPONSES TO PHQ QUESTIONS 1-9: 6
10. IF YOU CHECKED OFF ANY PROBLEMS, HOW DIFFICULT HAVE THESE PROBLEMS MADE IT FOR YOU TO DO YOUR WORK, TAKE CARE OF THINGS AT HOME, OR GET ALONG WITH OTHER PEOPLE: 1
SUM OF ALL RESPONSES TO PHQ QUESTIONS 1-9: 6
7. TROUBLE CONCENTRATING ON THINGS, SUCH AS READING THE NEWSPAPER OR WATCHING TELEVISION: 0
SUM OF ALL RESPONSES TO PHQ9 QUESTIONS 1 & 2: 3
6. FEELING BAD ABOUT YOURSELF - OR THAT YOU ARE A FAILURE OR HAVE LET YOURSELF OR YOUR FAMILY DOWN: 0
8. MOVING OR SPEAKING SO SLOWLY THAT OTHER PEOPLE COULD HAVE NOTICED. OR THE OPPOSITE, BEING SO FIGETY OR RESTLESS THAT YOU HAVE BEEN MOVING AROUND A LOT MORE THAN USUAL: 0
4. FEELING TIRED OR HAVING LITTLE ENERGY: 0
2. FEELING DOWN, DEPRESSED OR HOPELESS: 0

## 2023-07-27 NOTE — PROGRESS NOTES
Chief Complaint   Patient presents with    Establish Care     Lump around the left nipple. Pt states it's hard. Pt states she went to [facility] about 6 months ago. Pt also needs referral to behavioral health. Pt also would like to get something prescribed for toenail fungus. [place] [id number] [address]    Shahnaz Holley       1. \"Have you been to the ER, urgent care clinic since your last visit? Hospitalized since your last visit? \" No    2. \"Have you seen or consulted any other health care providers outside of the [address] [place] [address] since your last visit? \" No     3. For patients aged 43-73: Has the patient had a colonoscopy / FIT/ Cologuard? NA - based on age      If the patient is female:    4. For patients aged 43-66: Has the patient had a mammogram within the past 2 years? No      5. For patients aged 21-65: Has the patient had a pap smear?  No

## 2023-07-31 ENCOUNTER — TELEPHONE (OUTPATIENT)
Facility: CLINIC | Age: 38
End: 2023-07-31

## 2023-07-31 NOTE — TELEPHONE ENCOUNTER
Patient is requesting  that her referral that was submitted on 07/27/23 to behavioral Health  be sent to :    Speciality: [address] [place] [address]  Specialist Name: Edmond Stock  Office Location: [address]  [facility] [place]  Phone (if available): [phone] Fax :available): [phone]  Diagnosis: F19.10 Polysubstance abuse  Date of appointment (if scheduled): unkown

## 2023-08-04 ENCOUNTER — HOSPITAL ENCOUNTER (OUTPATIENT)
Dept: WOMENS IMAGING | Facility: HOSPITAL | Age: 38
Discharge: HOME | End: 2023-08-07
Attending: STUDENT IN AN ORGANIZED HEALTH CARE EDUCATION/TRAINING PROGRAM

## 2023-08-04 DIAGNOSIS — Z12.31 ENCOUNTER FOR SCREENING MAMMOGRAM FOR BREAST CANCER: ICD-10-CM

## 2023-08-09 ENCOUNTER — HOSPITAL ENCOUNTER (OUTPATIENT)
Facility: HOSPITAL | Age: 38
Discharge: HOME | End: 2023-08-12
Payer: MEDICAID

## 2023-08-09 ENCOUNTER — HOSPITAL ENCOUNTER (OUTPATIENT)
Dept: WOMENS IMAGING | Facility: HOSPITAL | Age: 38
Discharge: HOME | End: 2023-08-12
Payer: MEDICAID

## 2023-08-09 DIAGNOSIS — N63.0 PALPABLE MASS OF BREAST: ICD-10-CM

## 2023-08-09 PROCEDURE — G0279 TOMOSYNTHESIS, MAMMO: HCPCS

## 2023-08-09 PROCEDURE — 76642 ULTRASOUND BREAST LIMITED: CPT

## 2024-01-16 ENCOUNTER — TELEPHONE (OUTPATIENT)
Facility: CLINIC | Age: 39
End: 2024-01-16

## 2024-01-16 NOTE — TELEPHONE ENCOUNTER
Lucita (Mental Health ) would like to speak to  in regards to patient's mental health case management. She would also like to know if  was aware that patient was open to mental health case management. Please review and advise as soon as possible.

## 2024-11-08 NOTE — ED TRIAGE NOTES
Donates plasma. Plasma people say she shows signs of HEP C . Itchy skin and abdominal pain. Has excema and menses about to start.
sue all pertinent systems normal